# Patient Record
Sex: MALE | Race: WHITE | Employment: FULL TIME | URBAN - METROPOLITAN AREA
[De-identification: names, ages, dates, MRNs, and addresses within clinical notes are randomized per-mention and may not be internally consistent; named-entity substitution may affect disease eponyms.]

---

## 2019-01-14 ENCOUNTER — OFFICE VISIT (OUTPATIENT)
Dept: FAMILY MEDICINE CLINIC | Age: 64
End: 2019-01-14

## 2019-01-14 VITALS
OXYGEN SATURATION: 98 % | WEIGHT: 167.2 LBS | TEMPERATURE: 98.7 F | HEIGHT: 67 IN | DIASTOLIC BLOOD PRESSURE: 75 MMHG | SYSTOLIC BLOOD PRESSURE: 126 MMHG | BODY MASS INDEX: 26.24 KG/M2 | RESPIRATION RATE: 16 BRPM | HEART RATE: 92 BPM

## 2019-01-14 DIAGNOSIS — I10 ESSENTIAL HYPERTENSION: ICD-10-CM

## 2019-01-14 DIAGNOSIS — J44.9 CHRONIC OBSTRUCTIVE PULMONARY DISEASE, UNSPECIFIED COPD TYPE (HCC): Primary | ICD-10-CM

## 2019-01-14 DIAGNOSIS — E78.2 MIXED HYPERLIPIDEMIA: ICD-10-CM

## 2019-01-14 DIAGNOSIS — Z11.59 ENCOUNTER FOR HEPATITIS C SCREENING TEST FOR LOW RISK PATIENT: ICD-10-CM

## 2019-01-14 RX ORDER — FLUTICASONE FUROATE AND VILANTEROL 100; 25 UG/1; UG/1
POWDER RESPIRATORY (INHALATION)
COMMUNITY
Start: 2017-03-19 | End: 2019-01-14 | Stop reason: SDUPTHER

## 2019-01-14 RX ORDER — ATORVASTATIN CALCIUM 40 MG/1
40 TABLET, FILM COATED ORAL DAILY
COMMUNITY
Start: 2017-03-13 | End: 2019-01-14 | Stop reason: SDUPTHER

## 2019-01-14 RX ORDER — AMLODIPINE BESYLATE AND BENAZEPRIL HYDROCHLORIDE 10; 40 MG/1; MG/1
1 CAPSULE ORAL DAILY
Qty: 90 CAP | Refills: 1 | Status: SHIPPED | OUTPATIENT
Start: 2019-01-14 | End: 2019-07-16 | Stop reason: SDUPTHER

## 2019-01-14 RX ORDER — MELOXICAM 15 MG/1
15 TABLET ORAL DAILY
COMMUNITY
End: 2019-01-14 | Stop reason: SDUPTHER

## 2019-01-14 RX ORDER — METOPROLOL SUCCINATE 100 MG/1
100 TABLET, EXTENDED RELEASE ORAL DAILY
Qty: 90 TAB | Refills: 1 | Status: SHIPPED | OUTPATIENT
Start: 2019-01-14 | End: 2019-07-18 | Stop reason: SDUPTHER

## 2019-01-14 RX ORDER — MELOXICAM 15 MG/1
15 TABLET ORAL DAILY
Qty: 90 TAB | Refills: 1 | Status: SHIPPED | OUTPATIENT
Start: 2019-01-14 | End: 2019-08-09 | Stop reason: SDUPTHER

## 2019-01-14 RX ORDER — METOPROLOL SUCCINATE 100 MG/1
100 TABLET, EXTENDED RELEASE ORAL DAILY
COMMUNITY
Start: 2017-03-27 | End: 2019-01-14 | Stop reason: SDUPTHER

## 2019-01-14 RX ORDER — ALBUTEROL SULFATE 5 MG/ML
SOLUTION RESPIRATORY (INHALATION) ONCE
COMMUNITY
End: 2021-02-02 | Stop reason: SDUPTHER

## 2019-01-14 RX ORDER — ATORVASTATIN CALCIUM 40 MG/1
40 TABLET, FILM COATED ORAL DAILY
Qty: 90 TAB | Refills: 1 | Status: SHIPPED | OUTPATIENT
Start: 2019-01-14 | End: 2019-09-14 | Stop reason: SDUPTHER

## 2019-01-14 RX ORDER — ALBUTEROL SULFATE 90 UG/1
AEROSOL, METERED RESPIRATORY (INHALATION)
COMMUNITY
Start: 2017-03-27 | End: 2019-01-14 | Stop reason: ALTCHOICE

## 2019-01-14 RX ORDER — AMLODIPINE BESYLATE AND BENAZEPRIL HYDROCHLORIDE 10; 40 MG/1; MG/1
1 CAPSULE ORAL DAILY
COMMUNITY
Start: 2017-03-13 | End: 2019-01-14 | Stop reason: SDUPTHER

## 2019-01-14 RX ORDER — FLUTICASONE FUROATE AND VILANTEROL 100; 25 UG/1; UG/1
1 POWDER RESPIRATORY (INHALATION) DAILY
Qty: 1 INHALER | Refills: 5 | Status: SHIPPED | OUTPATIENT
Start: 2019-01-14 | End: 2019-05-20 | Stop reason: DRUGHIGH

## 2019-01-14 NOTE — PROGRESS NOTES
HISTORY OF PRESENT ILLNESS  Oracio Truong is a 66 y.o. male. Patient presents to establish care    HPI  Pt is frustrated with his PCP. He states he has to go every month for OV to get his meds refilled. He states he does not understand this. Review of Systems   Constitutional: Negative. Eyes: Negative. Respiratory: Negative. Cardiovascular: Negative. Gastrointestinal: Negative. Visit Vitals  /75 (BP 1 Location: Left arm, BP Patient Position: Sitting)   Pulse 92   Temp 98.7 °F (37.1 °C) (Oral)   Resp 16   Ht 5' 7\" (1.702 m)   Wt 167 lb 3.2 oz (75.8 kg)   SpO2 98%   BMI 26.19 kg/m²     Physical Exam   Constitutional: He appears well-developed. No distress. Cardiovascular: Normal rate, regular rhythm and normal heart sounds. No murmur heard. Pulmonary/Chest: Effort normal and breath sounds normal. No respiratory distress. He has no wheezes. He has no rales. Musculoskeletal: He exhibits no edema. ASSESSMENT and PLAN    ICD-10-CM ICD-9-CM    1. Chronic obstructive pulmonary disease, unspecified COPD type ([facility] J44.9 496 fluticasone-vilanterol (BREO ELLIPTA) 100-25 mcg/dose inhaler      tiotropium bromide (SPIRIVA RESPIMAT) 1.25 mcg/actuation inhaler   2. Essential hypertension H28 132.3 METABOLIC PANEL, COMPREHENSIVE      metoprolol succinate (TOPROL-XL) 100 mg tablet      amLODIPine-benazepril (LOTREL) 10-40 mg per capsule      METABOLIC PANEL, COMPREHENSIVE   3. Mixed hyperlipidemia E78.2 272.2 LIPID PANEL      atorvastatin (LIPITOR) 40 mg tablet      LIPID PANEL   4. Encounter for hepatitis C screening test for low risk patient Z11.59 V73.89 HEPATITIS BE AB      HEPATITIS BE AB     PLAN:  We discussed his past history. Together we reviewed his medication list and labs that were done in August 2018    I have discussed the diagnosis with the patient and the intended plan as seen in the above orders.   The patient has received an after-visit summary and questions were answered concerning future plans. I have discussed medication side effects and warnings with the patient as well. Patient will call for further questions. Follow-up Disposition:  Return in about 6 months (around 7/14/2019).

## 2019-01-14 NOTE — PROGRESS NOTES
Patient is in the office today to establish care. 1. Have you been to the ER, urgent care clinic since your last visit? Hospitalized since your last visit? No    2. Have you seen or consulted any other health care providers outside of the [address] [place] [address] since your last visit? Include any pap smears or colon screening.  No

## 2019-01-15 LAB
A-G RATIO,AGRAT: 2 RATIO (ref 1.1–2.6)
ALBUMIN SERPL-MCNC: 4.6 G/DL (ref 3.5–5)
ALP SERPL-CCNC: 120 U/L (ref 40–125)
ALT SERPL-CCNC: 31 U/L (ref 5–40)
ANION GAP SERPL CALC-SCNC: 15 MMOL/L
AST SERPL W P-5'-P-CCNC: 25 U/L (ref 10–37)
BILIRUB SERPL-MCNC: 0.6 MG/DL (ref 0.2–1.2)
BUN SERPL-MCNC: 10 MG/DL (ref 6–22)
CALCIUM SERPL-MCNC: 9.5 MG/DL (ref 8.4–10.4)
CHLORIDE SERPL-SCNC: 95 MMOL/L (ref 98–110)
CHOLEST SERPL-MCNC: 190 MG/DL (ref 110–200)
CO2 SERPL-SCNC: 27 MMOL/L (ref 20–32)
CREAT SERPL-MCNC: 0.7 MG/DL (ref 0.8–1.6)
GFRAA, 66117: >60
GFRNA, 66118: >60
GLOBULIN,GLOB: 2.3 G/DL (ref 2–4)
GLUCOSE SERPL-MCNC: 96 MG/DL (ref 70–99)
HDLC SERPL-MCNC: 2.1 MG/DL (ref 0–5)
HDLC SERPL-MCNC: 89 MG/DL (ref 40–59)
LDLC SERPL CALC-MCNC: 76 MG/DL (ref 50–99)
POTASSIUM SERPL-SCNC: 5.4 MMOL/L (ref 3.5–5.5)
PROT SERPL-MCNC: 6.9 G/DL (ref 6.2–8.1)
SODIUM SERPL-SCNC: 137 MMOL/L (ref 133–145)
TRIGL SERPL-MCNC: 126 MG/DL (ref 40–149)
VLDLC SERPL CALC-MCNC: 25 MG/DL (ref 8–30)

## 2019-01-17 LAB — HEPATITIS BE AB, 006635: NEGATIVE

## 2019-05-20 ENCOUNTER — OFFICE VISIT (OUTPATIENT)
Dept: FAMILY MEDICINE CLINIC | Age: 64
End: 2019-05-20

## 2019-05-20 VITALS
DIASTOLIC BLOOD PRESSURE: 78 MMHG | HEART RATE: 90 BPM | WEIGHT: 160 LBS | RESPIRATION RATE: 17 BRPM | OXYGEN SATURATION: 98 % | TEMPERATURE: 98.2 F | HEIGHT: 67 IN | SYSTOLIC BLOOD PRESSURE: 156 MMHG | BODY MASS INDEX: 25.11 KG/M2

## 2019-05-20 DIAGNOSIS — E78.2 MIXED HYPERLIPIDEMIA: ICD-10-CM

## 2019-05-20 DIAGNOSIS — I10 ESSENTIAL HYPERTENSION: ICD-10-CM

## 2019-05-20 DIAGNOSIS — J44.9 CHRONIC OBSTRUCTIVE PULMONARY DISEASE, UNSPECIFIED COPD TYPE (HCC): Primary | ICD-10-CM

## 2019-05-20 RX ORDER — FLUTICASONE FUROATE AND VILANTEROL 200; 25 UG/1; UG/1
1 POWDER RESPIRATORY (INHALATION) DAILY
Qty: 1 INHALER | Refills: 5 | Status: SHIPPED | OUTPATIENT
Start: 2019-05-20 | End: 2019-11-18 | Stop reason: SDUPTHER

## 2019-05-20 NOTE — PROGRESS NOTES
Chief Complaint   Patient presents with    Medication Evaluation     1. Have you been to the ER, urgent care clinic since your last visit? Hospitalized since your last visit? No    2. Have you seen or consulted any other health care providers outside of the [address] [place] [address] since your last visit? Include any pap smears or colon screening.  No

## 2019-05-20 NOTE — PROGRESS NOTES
HISTORY OF PRESENT ILLNESS  Melquiades Wynn is a 60 y.o. male. Patient presents for med check. HPI  Pt uses about 3 inhalers in course of 6 weeks. He is not sure that the other inhalers help him. Pt started mucinex last week because his congestion has gotten thicker but no color change. Review of Systems   Constitutional: Negative. HENT: Positive for congestion. Respiratory: Positive for cough, sputum production and shortness of breath. Cardiovascular: Negative. Visit Vitals  /78 (BP 1 Location: Left arm, BP Patient Position: Sitting)   Pulse 90   Temp 98.2 °F (36.8 °C) (Oral)   Resp 17   Ht 5' 7\" (1.702 m)   Wt 160 lb (72.6 kg)   SpO2 98%   BMI 25.06 kg/m²     Physical Exam   Constitutional: He is oriented to person, place, and time. He appears well-developed. No distress. Cardiovascular: Normal rate and regular rhythm. No murmur heard. Pulmonary/Chest: Effort normal. No respiratory distress. He has no wheezes. He has no rales. Neurological: He is alert and oriented to person, place, and time. bronchial breath sounds which clear on cough. ASSESSMENT and PLAN    ICD-10-CM ICD-9-CM    1. Chronic obstructive pulmonary disease, unspecified COPD type (HCC) J44.9 496 fluticasone furoate-vilanterol (BREO ELLIPTA) 200-25 mcg/dose inhaler      tiotropium bromide (SPIRIVA RESPIMAT) 2.5 mcg/actuation inhaler   2. Essential hypertension S07 039.4 METABOLIC PANEL, COMPREHENSIVE      METABOLIC PANEL, COMPREHENSIVE   3. Mixed hyperlipidemia E78.2 272.2 LIPID PANEL      LIPID PANEL     PLAN:  We discussed his COPD and my concerns that it is not controlled since he is going through 3 rescue inhalers every 6 weeks. Pt's Breo was increased from 100/25 to 200/25  And his   Spiriva from 1.25 was increased to 2.5    Pt is to call with any concerns. I have discussed the diagnosis with the patient and the intended plan as seen in the above orders.   The patient has received an after-visit summary and questions were answered concerning future plans. I have discussed medication side effects and warnings with the patient as well. Patient will call for further questions. Follow-up and Dispositions    · Return in about 6 months (around 11/20/2019) for cancel July appt and sched one for November.

## 2019-05-21 LAB
A-G RATIO,AGRAT: 2.4 RATIO (ref 1.1–2.6)
ALBUMIN SERPL-MCNC: 4.7 G/DL (ref 3.5–5)
ALP SERPL-CCNC: 125 U/L (ref 40–125)
ALT SERPL-CCNC: 28 U/L (ref 5–40)
ANION GAP SERPL CALC-SCNC: 14 MMOL/L
AST SERPL W P-5'-P-CCNC: 28 U/L (ref 10–37)
BILIRUB SERPL-MCNC: 0.5 MG/DL (ref 0.2–1.2)
BUN SERPL-MCNC: 10 MG/DL (ref 6–22)
CALCIUM SERPL-MCNC: 9.3 MG/DL (ref 8.4–10.4)
CHLORIDE SERPL-SCNC: 99 MMOL/L (ref 98–110)
CHOLEST SERPL-MCNC: 173 MG/DL (ref 110–200)
CO2 SERPL-SCNC: 23 MMOL/L (ref 20–32)
CREAT SERPL-MCNC: 0.6 MG/DL (ref 0.8–1.6)
GFRAA, 66117: >60
GFRNA, 66118: >60
GLOBULIN,GLOB: 2 G/DL (ref 2–4)
GLUCOSE SERPL-MCNC: 103 MG/DL (ref 70–99)
HDLC SERPL-MCNC: 2.4 MG/DL (ref 0–5)
HDLC SERPL-MCNC: 71 MG/DL (ref 40–59)
LDLC SERPL CALC-MCNC: 83 MG/DL (ref 50–99)
POTASSIUM SERPL-SCNC: 4.5 MMOL/L (ref 3.5–5.5)
PROT SERPL-MCNC: 6.7 G/DL (ref 6.2–8.1)
SODIUM SERPL-SCNC: 136 MMOL/L (ref 133–145)
TRIGL SERPL-MCNC: 92 MG/DL (ref 40–149)
VLDLC SERPL CALC-MCNC: 18 MG/DL (ref 8–30)

## 2019-06-19 RX ORDER — ALBUTEROL SULFATE 90 UG/1
AEROSOL, METERED RESPIRATORY (INHALATION)
Qty: 1 INHALER | Refills: 1 | Status: SHIPPED | OUTPATIENT
Start: 2019-06-19 | End: 2019-07-26 | Stop reason: SDUPTHER

## 2019-07-16 DIAGNOSIS — I10 ESSENTIAL HYPERTENSION: ICD-10-CM

## 2019-07-17 RX ORDER — AMLODIPINE BESYLATE AND BENAZEPRIL HYDROCHLORIDE 10; 40 MG/1; MG/1
CAPSULE ORAL
Qty: 90 CAP | Refills: 1 | Status: SHIPPED | OUTPATIENT
Start: 2019-07-17 | End: 2020-02-03

## 2019-07-18 DIAGNOSIS — I10 ESSENTIAL HYPERTENSION: ICD-10-CM

## 2019-07-18 RX ORDER — METOPROLOL SUCCINATE 100 MG/1
TABLET, EXTENDED RELEASE ORAL
Qty: 90 TAB | Refills: 1 | Status: SHIPPED | OUTPATIENT
Start: 2019-07-18 | End: 2020-02-03

## 2019-07-26 RX ORDER — ALBUTEROL SULFATE 90 UG/1
AEROSOL, METERED RESPIRATORY (INHALATION)
Qty: 18 G | Refills: 1 | Status: SHIPPED | OUTPATIENT
Start: 2019-07-26 | End: 2019-08-27 | Stop reason: SDUPTHER

## 2019-08-27 RX ORDER — ALBUTEROL SULFATE 90 UG/1
AEROSOL, METERED RESPIRATORY (INHALATION)
Qty: 18 G | Refills: 1 | Status: SHIPPED | OUTPATIENT
Start: 2019-08-27 | End: 2019-09-29 | Stop reason: SDUPTHER

## 2019-10-01 RX ORDER — ALBUTEROL SULFATE 90 UG/1
AEROSOL, METERED RESPIRATORY (INHALATION)
Qty: 18 G | Refills: 1 | Status: SHIPPED | OUTPATIENT
Start: 2019-10-01 | End: 2019-11-01 | Stop reason: SDUPTHER

## 2019-11-01 RX ORDER — ALBUTEROL SULFATE 90 UG/1
AEROSOL, METERED RESPIRATORY (INHALATION)
Qty: 18 G | Refills: 1 | Status: SHIPPED | OUTPATIENT
Start: 2019-11-01 | End: 2019-12-06 | Stop reason: SDUPTHER

## 2019-11-18 ENCOUNTER — OFFICE VISIT (OUTPATIENT)
Dept: FAMILY MEDICINE CLINIC | Age: 64
End: 2019-11-18

## 2019-11-18 VITALS
OXYGEN SATURATION: 98 % | HEART RATE: 83 BPM | BODY MASS INDEX: 24.96 KG/M2 | RESPIRATION RATE: 16 BRPM | DIASTOLIC BLOOD PRESSURE: 78 MMHG | WEIGHT: 159 LBS | TEMPERATURE: 97.8 F | HEIGHT: 67 IN | SYSTOLIC BLOOD PRESSURE: 144 MMHG

## 2019-11-18 DIAGNOSIS — Z12.11 SCREENING FOR COLON CANCER: ICD-10-CM

## 2019-11-18 DIAGNOSIS — J44.9 CHRONIC OBSTRUCTIVE PULMONARY DISEASE, UNSPECIFIED COPD TYPE (HCC): ICD-10-CM

## 2019-11-18 DIAGNOSIS — E78.2 MIXED HYPERLIPIDEMIA: Primary | ICD-10-CM

## 2019-11-18 DIAGNOSIS — Z12.5 SCREENING FOR PROSTATE CANCER: ICD-10-CM

## 2019-11-18 DIAGNOSIS — Z11.59 ENCOUNTER FOR HEPATITIS C SCREENING TEST FOR LOW RISK PATIENT: ICD-10-CM

## 2019-11-18 RX ORDER — FLUTICASONE FUROATE AND VILANTEROL TRIFENATATE 200; 25 UG/1; UG/1
POWDER RESPIRATORY (INHALATION)
Qty: 1 EACH | Refills: 3 | Status: SHIPPED | OUTPATIENT
Start: 2019-11-18 | End: 2020-03-17

## 2019-11-18 RX ORDER — ATORVASTATIN CALCIUM 40 MG/1
TABLET, FILM COATED ORAL
Qty: 90 TAB | Refills: 1 | Status: CANCELLED | OUTPATIENT
Start: 2019-11-18

## 2019-11-18 NOTE — PROGRESS NOTES
HISTORY OF PRESENT ILLNESS  Manda Turner is a 60 y.o. male. Patient presents for chronic care follow. HPI  Doing well. He just recently got refills. He is now retired. Review of Systems   Constitutional: Negative. Respiratory: Positive for cough (no change) and sputum production (stable). Negative for shortness of breath. Cardiovascular: Negative. Gastrointestinal: Negative. Musculoskeletal: Positive for back pain and joint pain. Visit Vitals  /78 (BP 1 Location: Left arm)   Pulse 83   Temp 97.8 °F (36.6 °C) (Oral)   Resp 16   Ht 5' 7\" (1.702 m)   Wt 159 lb (72.1 kg)   SpO2 98%   BMI 24.90 kg/m²     Physical Exam   Constitutional: He is oriented to person, place, and time. He appears well-developed. No distress. Cardiovascular: Normal rate, regular rhythm and normal heart sounds. No murmur heard. Pulmonary/Chest: Effort normal and breath sounds normal. No respiratory distress. He has no wheezes. He has no rales. Neurological: He is alert and oriented to person, place, and time. ASSESSMENT and PLAN    ICD-10-CM ICD-9-CM    1. Mixed hyperlipidemia E78.2 272.2 LIPID PANEL      METABOLIC PANEL, COMPREHENSIVE   2. Encounter for hepatitis C screening test for low risk patient Z11.59 V73.89 HCV RNA AMELIA QUALITATIVE   3. Screening for colon cancer Z12.11 V76.51 COLOGUARD TEST (FECAL DNA COLORECTAL CANCER SCREENING)   4. Screening for prostate cancer Z12.5 V76.44 PSA SCREENING (SCREENING)   5. Chronic obstructive pulmonary disease, unspecified COPD type ([facility] J44.9 496      PLAN:  We reviewed his history. Pt given DME order for CPAP supplies> Pt understands he will have to bring this to the [place] for approval.    I have discussed the diagnosis with the patient and the intended plan as seen in the above orders. The patient has received an after-visit summary and questions were answered concerning future plans.   I have discussed medication side effects and warnings with the patient as well. Patient will call for further questions. Follow-up and Dispositions    · Return in about 6 months (around 5/18/2020) for chronic care. Pt is to call with any concerns.

## 2019-11-18 NOTE — PROGRESS NOTES
Pt is here for 6 month follow up hypertension, cholesterol    1. Have you been to the ER, urgent care clinic since your last visit? Hospitalized since your last visit? No    2. Have you seen or consulted any other health care providers outside of the [address] [place] [address] since your last visit? Include any pap smears or colon screening.  No

## 2019-11-19 LAB
A-G RATIO,AGRAT: 1.8 RATIO (ref 1.1–2.6)
ALBUMIN SERPL-MCNC: 4.4 G/DL (ref 3.5–5)
ALP SERPL-CCNC: 120 U/L (ref 40–125)
ALT SERPL-CCNC: 26 U/L (ref 5–40)
ANION GAP SERPL CALC-SCNC: 18 MMOL/L
AST SERPL W P-5'-P-CCNC: 27 U/L (ref 10–37)
BILIRUB SERPL-MCNC: 0.4 MG/DL (ref 0.2–1.2)
BUN SERPL-MCNC: 11 MG/DL (ref 6–22)
CALCIUM SERPL-MCNC: 9.2 MG/DL (ref 8.4–10.5)
CHLORIDE SERPL-SCNC: 95 MMOL/L (ref 98–110)
CHOLEST SERPL-MCNC: 168 MG/DL (ref 110–200)
CO2 SERPL-SCNC: 23 MMOL/L (ref 20–32)
CREAT SERPL-MCNC: 0.7 MG/DL (ref 0.8–1.6)
GFRAA, 66117: >60
GFRNA, 66118: >60
GLOBULIN,GLOB: 2.5 G/DL (ref 2–4)
GLUCOSE SERPL-MCNC: 90 MG/DL (ref 70–99)
HDLC SERPL-MCNC: 2 MG/DL (ref 0–5)
HDLC SERPL-MCNC: 82 MG/DL
LDL/HDL RATIO,LDHD: 0.8
LDLC SERPL CALC-MCNC: 63 MG/DL (ref 50–99)
NON-HDL CHOLESTEROL, 011976: 86 MG/DL
POTASSIUM SERPL-SCNC: 4.7 MMOL/L (ref 3.5–5.5)
PROT SERPL-MCNC: 6.9 G/DL (ref 6.2–8.1)
PSA SERPL-MCNC: 4.01 NG/ML
SODIUM SERPL-SCNC: 136 MMOL/L (ref 133–145)
TRIGL SERPL-MCNC: 114 MG/DL (ref 40–149)
VLDLC SERPL CALC-MCNC: 23 MG/DL (ref 8–30)

## 2019-11-21 LAB — HCV RNA QUAL PCR,9951751: NEGATIVE

## 2019-12-10 RX ORDER — ALBUTEROL SULFATE 90 UG/1
AEROSOL, METERED RESPIRATORY (INHALATION)
Qty: 18 G | Refills: 0 | Status: SHIPPED | OUTPATIENT
Start: 2019-12-10 | End: 2019-12-27

## 2019-12-16 ENCOUNTER — TELEPHONE (OUTPATIENT)
Dept: FAMILY MEDICINE CLINIC | Age: 64
End: 2019-12-16

## 2019-12-19 NOTE — TELEPHONE ENCOUNTER
Maya Gilmore, NP  You 3 days ago      Please advise Pt that his Cologuard is negative. Next one is in 3 years. Routing comment      Pt aware cologuard test was negative & will need to be repeated in 3 years.

## 2019-12-27 RX ORDER — ALBUTEROL SULFATE 90 UG/1
AEROSOL, METERED RESPIRATORY (INHALATION)
Qty: 18 G | Refills: 0 | Status: SHIPPED | OUTPATIENT
Start: 2019-12-27 | End: 2020-01-14

## 2020-01-03 ENCOUNTER — OFFICE VISIT (OUTPATIENT)
Dept: SURGERY | Age: 65
End: 2020-01-03

## 2020-01-03 VITALS
BODY MASS INDEX: 25.9 KG/M2 | TEMPERATURE: 98.9 F | RESPIRATION RATE: 17 BRPM | WEIGHT: 165 LBS | HEART RATE: 83 BPM | DIASTOLIC BLOOD PRESSURE: 75 MMHG | SYSTOLIC BLOOD PRESSURE: 123 MMHG | OXYGEN SATURATION: 98 % | HEIGHT: 67 IN

## 2020-01-03 DIAGNOSIS — K40.90 RIGHT INGUINAL HERNIA: Primary | ICD-10-CM

## 2020-01-03 NOTE — PROGRESS NOTES
General Surgery Consult      Vlaidmir Lee date: (Not on file)    MRN: [id number]     : 1955     Age: 60 y.o. Attending Physician: Rosalinda Sandhu MD, [facility]      History of Present Illness:     Jacqueline Velasquez is a 60 y.o. male was referred for evaluation of a symptomatic right inguinal hernia. The patient has stated that that the weeks before  he did a lot of heavy furniture and carpet lifting . He said that on [place] day he said he felt a sudden onset of severe pain in the right groin. [facility] and he was told that he has a right inguinal hernia and to follow-up with us . He said that the pain is dull and intermittent and is localized in the right groin . He only noticed a bulge when he coughs . He denies any nausea or vomiting or any change in bowel habits. He had an umbilical hernia repair in the past with the mesh. There are no active problems to display for this patient. Past Medical History:   Diagnosis Date    Chronic obstructive pulmonary disease ([address]     Hypercholesterolemia     Hypertension       Past Surgical History:   Procedure Laterality Date    HX CHOLECYSTECTOMY      HX GI      HX HERNIA REPAIR        Social History     Tobacco Use    Smoking status: Heavy Tobacco Smoker     Packs/day: 2.00    Smokeless tobacco: Former User   Substance Use Topics    Alcohol use: Yes     Comment: daily beer      Social History     Tobacco Use   Smoking Status Heavy Tobacco Smoker    Packs/day: 2.00   Smokeless Tobacco Former User     No family history on file.    Current Outpatient Medications   Medication Sig    albuterol (VENTOLIN HFA) 90 mcg/actuation inhaler inhale 1 to 2 puffs by mouth and INTO THE LUNGS every 4 to 6 hours if needed    atorvastatin (LIPITOR) 40 mg tablet take 1 tablet by mouth once daily    BREO ELLIPTA 200-25 mcg/dose inhaler inhalation 1 puff by mouth once daily    meloxicam (MOBIC) 15 mg tablet take 1 tablet by mouth once daily    metoprolol succinate (TOPROL-XL) 100 mg tablet take 1 tablet by mouth once daily    amLODIPine-benazepril (LOTREL) 10-40 mg per capsule take 1 capsule by mouth once daily    tiotropium bromide (SPIRIVA RESPIMAT) 2.5 mcg/actuation inhaler Take 2 Puffs by inhalation daily.  albuterol (PROVENTIL) 5 mg/mL nebulizer solution by Nebulization route once. No current facility-administered medications for this visit. No Known Allergies     Review of Systems:  Constitutional: negative  Eyes: negative  Ears, Nose, Mouth, Throat, and Face: negative  Respiratory: negative  Cardiovascular: negative  Gastrointestinal: positive for abdominal pain and Right groin pain and bulge  Genitourinary:negative  Integument/Breast: negative  Hematologic/Lymphatic: negative  Musculoskeletal:negative  Neurological: negative  Behavioral/Psychiatric: negative  Endocrine: negative  Allergic/Immunologic: negative    Objective:     Visit Vitals  /75   Pulse 83   Temp 98.9 °F (37.2 °C) (Oral)   Resp 17   Ht 5' 7\" (1.702 m)   Wt 74.8 kg (165 lb)   SpO2 98%   BMI 25.84 kg/m²       Physical Exam:      General:  in no apparent distress, alert, oriented times 3 and cooperative   Eyes:  conjunctivae and sclerae normal, pupils equal, round, reactive to light   Throat & Neck: no erythema or exudates noted and neck supple and symmetrical; no palpable masses   Lungs:   clear to auscultation bilaterally   Heart:  Regular rate and rhythm   Abdomen:   rounded, soft, nontender, nondistended, no masses or organomegaly . There is a right inguinal hernia that is tender to palpation.  .    Extremities: extremities normal, atraumatic, no cyanosis or edema   Skin: Normal.       Imaging and Lab Review:     CBC: No results found for: WBC, RBC, HGB, HCT, PLT, HGBEXT, HCTEXT, PLTEXT  BMP:   Lab Results   Component Value Date/Time    Glucose 90 11/18/2019 03:33 PM    Sodium 136 11/18/2019 03:33 PM    Potassium 4.7 11/18/2019 03:33 PM Chloride 95 (L) 11/18/2019 03:33 PM    CO2 23 11/18/2019 03:33 PM    BUN 11 11/18/2019 03:33 PM    Creatinine 0.7 (L) 11/18/2019 03:33 PM    Calcium 9.2 11/18/2019 03:33 PM     CMP:  Lab Results   Component Value Date/Time    Glucose 90 11/18/2019 03:33 PM    Sodium 136 11/18/2019 03:33 PM    Potassium 4.7 11/18/2019 03:33 PM    Chloride 95 (L) 11/18/2019 03:33 PM    CO2 23 11/18/2019 03:33 PM    BUN 11 11/18/2019 03:33 PM    Creatinine 0.7 (L) 11/18/2019 03:33 PM    Calcium 9.2 11/18/2019 03:33 PM    Anion gap 18.0 11/18/2019 03:33 PM    Alk. phosphatase 120 11/18/2019 03:33 PM    Protein, total 6.9 11/18/2019 03:33 PM    Albumin 4.4 11/18/2019 03:33 PM    Globulin 2.5 11/18/2019 03:33 PM    A-G Ratio 1.8 11/18/2019 03:33 PM       No results found for this or any previous visit (from the past 24 hour(s)). images and reports reviewed    Assessment:   Radha Nowak is a 60 y.o. male is presenting with a picture of symptomatic right inguinal hernia. I Discussed the possibility of incarceration, strangulation, enlargement in size over time, and the risk of emergency surgery in the face of strangulation. I also discussed the use of prosthetic materials (mesh), including the risk of infection. Also discussed the risk of surgery including recurrence and the possible need for reoperation and removal of mesh if used, possibility of postoperative small bowel injury, obstruction or ileus, and the risks of general anesthetic. I explained to the the patient about the robotic hernia repair procedure. Plan:     1. Schedule for robotic right inguinal hernia repair with placement of mesh. 2. No heavy lifting for 2 weeks after the surgery (More than 15 pounds)  3. Avoid constipation by taking stool softener.     Please call me if you have any questions (cell phone: [phone])     Signed By: Raul Vaughan MD     January 3, 2020

## 2020-01-06 NOTE — PERIOP NOTES
PAT - SURGICAL PRE-ADMISSION INSTRUCTIONS    NAME:  Harriett Doe                                                          TODAY'S DATE:  1/6/2020    SURGERY DATE:  1/9/2020                                  SURGERY ARRIVAL TIME:   0930 TBV    1. Do NOT eat or drink anything, including candy or gum, after MIDNIGHT on 1/8/20 , unless you have specific instructions from your Surgeon or Anesthesia Provider to do so. 2. No smoking on the day of surgery. 3. No alcohol 24 hours prior to the day of surgery. 4. No recreational drugs for one week prior to the day of surgery. 5. Leave all valuables, including money/purse, at home. 6. Remove all jewelry, nail polish, makeup (including mascara); no lotions, powders, deodorant, or perfume/cologne/after shave. 7. Glasses/Contact lenses and Dentures may be worn to the hospital.  They will be removed prior to surgery. 8. Call your doctor if symptoms of a cold or illness develop within 24 ours prior to surgery. 9. AN ADULT MUST DRIVE YOU HOME AFTER OUTPATIENT SURGERY. 10. If you are having an OUTPATIENT procedure, please make arrangements for a responsible adult to be with you for 24 hours after your surgery. 11. If you are admitted to the hospital, you will be assigned to a bed after surgery is complete. Normally a family member will not be able to see you until you are in your assigned bed. 15. Family is encouraged to accompany you to the hospital.  We ask visitors in the treatment area to be limited to ONE person at a time to ensure patient privacy. EXCEPTIONS WILL BE MADE AS NEEDED. 15. Children under 12 are discouraged from entering the treatment area and need to be supervised by an adult when in the waiting room. Special Instructions:    Bring inhaler. , Take these medications the morning of surgery with a sip of water:  MORNING MEDS    Patient Prep:    shower with anti-bacterial soap    These surgical instructions were reviewed with PATIENT during the PAT PHONE CALL. Directions: On the morning of surgery, please go to the [facility]. Enter the building from the [facility] entrance, 1st floor (next to the Emergency Room entrance). Take the elevator to the 2nd floor. Sign in at the Registration Desk.     If you have any questions and/or concerns, please do not hesitate to call:  (Prior to the day of surgery)  [facility] unit:  [phone]  (Day of surgery)  [facility] unit:  [phone]

## 2020-01-08 ENCOUNTER — ANESTHESIA EVENT (OUTPATIENT)
Dept: SURGERY | Age: 65
End: 2020-01-08
Payer: COMMERCIAL

## 2020-01-09 ENCOUNTER — ANESTHESIA (OUTPATIENT)
Dept: SURGERY | Age: 65
End: 2020-01-09
Payer: COMMERCIAL

## 2020-01-09 ENCOUNTER — HOSPITAL ENCOUNTER (OUTPATIENT)
Age: 65
Setting detail: OUTPATIENT SURGERY
Discharge: HOME | End: 2020-01-09
Attending: SURGERY | Admitting: SURGERY
Payer: COMMERCIAL

## 2020-01-09 VITALS
HEART RATE: 51 BPM | BODY MASS INDEX: 25.74 KG/M2 | DIASTOLIC BLOOD PRESSURE: 69 MMHG | HEIGHT: 67 IN | OXYGEN SATURATION: 98 % | TEMPERATURE: 97.4 F | WEIGHT: 164 LBS | SYSTOLIC BLOOD PRESSURE: 121 MMHG | RESPIRATION RATE: 16 BRPM

## 2020-01-09 LAB
ATRIAL RATE: 73 BPM
CALCULATED P AXIS, ECG09: 79 DEGREES
CALCULATED R AXIS, ECG10: -84 DEGREES
CALCULATED T AXIS, ECG11: 82 DEGREES
DIAGNOSIS, 93000: NORMAL
P-R INTERVAL, ECG05: 150 MS
Q-T INTERVAL, ECG07: 370 MS
QRS DURATION, ECG06: 106 MS
QTC CALCULATION (BEZET), ECG08: 407 MS
VENTRICULAR RATE, ECG03: 73 BPM

## 2020-01-09 PROCEDURE — 77030031139 HC SUT VCRL2 J&J -A: Performed by: SURGERY

## 2020-01-09 PROCEDURE — 74011250636 HC RX REV CODE- 250/636: Performed by: NURSE ANESTHETIST, CERTIFIED REGISTERED

## 2020-01-09 PROCEDURE — 77030018842 HC SOL IRR SOD CL 9% BAXT -A: Performed by: SURGERY

## 2020-01-09 PROCEDURE — 74011000258 HC RX REV CODE- 258: Performed by: NURSE ANESTHETIST, CERTIFIED REGISTERED

## 2020-01-09 PROCEDURE — 74011000250 HC RX REV CODE- 250: Performed by: SURGERY

## 2020-01-09 PROCEDURE — 74011250636 HC RX REV CODE- 250/636: Performed by: SURGERY

## 2020-01-09 PROCEDURE — 77030008477 HC STYL SATN SLP COVD -A: Performed by: NURSE ANESTHETIST, CERTIFIED REGISTERED

## 2020-01-09 PROCEDURE — C1781 MESH (IMPLANTABLE): HCPCS | Performed by: SURGERY

## 2020-01-09 PROCEDURE — 77030002933 HC SUT MCRYL J&J -A: Performed by: SURGERY

## 2020-01-09 PROCEDURE — 77030020703 HC SEAL CANN DISP INTU -B: Performed by: SURGERY

## 2020-01-09 PROCEDURE — 77030008683 HC TU ET CUF COVD -A: Performed by: NURSE ANESTHETIST, CERTIFIED REGISTERED

## 2020-01-09 PROCEDURE — 77030022704 HC SUT VLOC COVD -B: Performed by: SURGERY

## 2020-01-09 PROCEDURE — 74011250637 HC RX REV CODE- 250/637: Performed by: NURSE ANESTHETIST, CERTIFIED REGISTERED

## 2020-01-09 PROCEDURE — 74011000250 HC RX REV CODE- 250: Performed by: NURSE ANESTHETIST, CERTIFIED REGISTERED

## 2020-01-09 PROCEDURE — 93005 ELECTROCARDIOGRAM TRACING: CPT

## 2020-01-09 PROCEDURE — 76060000032 HC ANESTHESIA 0.5 TO 1 HR: Performed by: SURGERY

## 2020-01-09 PROCEDURE — 77030010507 HC ADH SKN DERMBND J&J -B: Performed by: SURGERY

## 2020-01-09 PROCEDURE — 76210000006 HC OR PH I REC 0.5 TO 1 HR: Performed by: SURGERY

## 2020-01-09 PROCEDURE — 74011000272 HC RX REV CODE- 272: Performed by: SURGERY

## 2020-01-09 PROCEDURE — 76210000021 HC REC RM PH II 0.5 TO 1 HR: Performed by: SURGERY

## 2020-01-09 PROCEDURE — 76010000933 HC OR TIME 0.5 TO 1HR INTENSV - TIER 2: Performed by: SURGERY

## 2020-01-09 PROCEDURE — 77030035277 HC OBTRTR BLDELSS DISP INTU -B: Performed by: SURGERY

## 2020-01-09 DEVICE — LAPAROSCOPIC SELF-FIXATING MESH POLYESTER WITH POLYLACTIC ACID GRIPS AND COLLAGEN FILM
Type: IMPLANTABLE DEVICE | Site: INGUINAL | Status: FUNCTIONAL
Brand: PROGRIP

## 2020-01-09 RX ORDER — SODIUM CHLORIDE, SODIUM LACTATE, POTASSIUM CHLORIDE, CALCIUM CHLORIDE 600; 310; 30; 20 MG/100ML; MG/100ML; MG/100ML; MG/100ML
150 INJECTION, SOLUTION INTRAVENOUS CONTINUOUS
Status: DISCONTINUED | OUTPATIENT
Start: 2020-01-09 | End: 2020-01-09 | Stop reason: HOSPADM

## 2020-01-09 RX ORDER — KETOROLAC TROMETHAMINE 30 MG/ML
INJECTION, SOLUTION INTRAMUSCULAR; INTRAVENOUS AS NEEDED
Status: DISCONTINUED | OUTPATIENT
Start: 2020-01-09 | End: 2020-01-09 | Stop reason: HOSPADM

## 2020-01-09 RX ORDER — KETOROLAC TROMETHAMINE 10 MG/1
10 TABLET, FILM COATED ORAL
Qty: 24 TAB | Refills: 0 | Status: SHIPPED | OUTPATIENT
Start: 2020-01-09 | End: 2021-02-02 | Stop reason: ALTCHOICE

## 2020-01-09 RX ORDER — VECURONIUM BROMIDE FOR INJECTION 1 MG/ML
INJECTION, POWDER, LYOPHILIZED, FOR SOLUTION INTRAVENOUS AS NEEDED
Status: DISCONTINUED | OUTPATIENT
Start: 2020-01-09 | End: 2020-01-09 | Stop reason: HOSPADM

## 2020-01-09 RX ORDER — FENTANYL CITRATE 50 UG/ML
INJECTION, SOLUTION INTRAMUSCULAR; INTRAVENOUS AS NEEDED
Status: DISCONTINUED | OUTPATIENT
Start: 2020-01-09 | End: 2020-01-09 | Stop reason: HOSPADM

## 2020-01-09 RX ORDER — FENTANYL CITRATE 50 UG/ML
25 INJECTION, SOLUTION INTRAMUSCULAR; INTRAVENOUS AS NEEDED
Status: DISCONTINUED | OUTPATIENT
Start: 2020-01-09 | End: 2020-01-09 | Stop reason: HOSPADM

## 2020-01-09 RX ORDER — MIDAZOLAM HYDROCHLORIDE 1 MG/ML
INJECTION, SOLUTION INTRAMUSCULAR; INTRAVENOUS AS NEEDED
Status: DISCONTINUED | OUTPATIENT
Start: 2020-01-09 | End: 2020-01-09 | Stop reason: HOSPADM

## 2020-01-09 RX ORDER — MAGNESIUM SULFATE 100 %
4 CRYSTALS MISCELLANEOUS AS NEEDED
Status: DISCONTINUED | OUTPATIENT
Start: 2020-01-09 | End: 2020-01-09 | Stop reason: HOSPADM

## 2020-01-09 RX ORDER — SUCCINYLCHOLINE CHLORIDE 100 MG/5ML
SYRINGE (ML) INTRAVENOUS AS NEEDED
Status: DISCONTINUED | OUTPATIENT
Start: 2020-01-09 | End: 2020-01-09 | Stop reason: HOSPADM

## 2020-01-09 RX ORDER — SODIUM CHLORIDE, SODIUM LACTATE, POTASSIUM CHLORIDE, CALCIUM CHLORIDE 600; 310; 30; 20 MG/100ML; MG/100ML; MG/100ML; MG/100ML
25 INJECTION, SOLUTION INTRAVENOUS CONTINUOUS
Status: DISCONTINUED | OUTPATIENT
Start: 2020-01-09 | End: 2020-01-09 | Stop reason: HOSPADM

## 2020-01-09 RX ORDER — ACETAMINOPHEN 500 MG
500 TABLET ORAL
Qty: 24 TAB | Refills: 0 | Status: SHIPPED | OUTPATIENT
Start: 2020-01-09 | End: 2021-03-05 | Stop reason: ALTCHOICE

## 2020-01-09 RX ORDER — LIDOCAINE HYDROCHLORIDE 20 MG/ML
INJECTION, SOLUTION EPIDURAL; INFILTRATION; INTRACAUDAL; PERINEURAL AS NEEDED
Status: DISCONTINUED | OUTPATIENT
Start: 2020-01-09 | End: 2020-01-09 | Stop reason: HOSPADM

## 2020-01-09 RX ORDER — GLYCOPYRROLATE 0.2 MG/ML
INJECTION INTRAMUSCULAR; INTRAVENOUS AS NEEDED
Status: DISCONTINUED | OUTPATIENT
Start: 2020-01-09 | End: 2020-01-09 | Stop reason: HOSPADM

## 2020-01-09 RX ORDER — CEFAZOLIN SODIUM 2 G/50ML
2 SOLUTION INTRAVENOUS
Status: COMPLETED | OUTPATIENT
Start: 2020-01-09 | End: 2020-01-09

## 2020-01-09 RX ORDER — SODIUM CHLORIDE 0.9 % (FLUSH) 0.9 %
5-40 SYRINGE (ML) INJECTION EVERY 8 HOURS
Status: DISCONTINUED | OUTPATIENT
Start: 2020-01-09 | End: 2020-01-09 | Stop reason: HOSPADM

## 2020-01-09 RX ORDER — ONDANSETRON 2 MG/ML
INJECTION INTRAMUSCULAR; INTRAVENOUS AS NEEDED
Status: DISCONTINUED | OUTPATIENT
Start: 2020-01-09 | End: 2020-01-09 | Stop reason: HOSPADM

## 2020-01-09 RX ORDER — SODIUM CHLORIDE 0.9 % (FLUSH) 0.9 %
5-40 SYRINGE (ML) INJECTION AS NEEDED
Status: DISCONTINUED | OUTPATIENT
Start: 2020-01-09 | End: 2020-01-09 | Stop reason: HOSPADM

## 2020-01-09 RX ORDER — ONDANSETRON 2 MG/ML
4 INJECTION INTRAMUSCULAR; INTRAVENOUS ONCE
Status: DISCONTINUED | OUTPATIENT
Start: 2020-01-09 | End: 2020-01-09 | Stop reason: HOSPADM

## 2020-01-09 RX ORDER — HYDROMORPHONE HYDROCHLORIDE 1 MG/ML
0.5 INJECTION, SOLUTION INTRAMUSCULAR; INTRAVENOUS; SUBCUTANEOUS
Status: DISCONTINUED | OUTPATIENT
Start: 2020-01-09 | End: 2020-01-09 | Stop reason: HOSPADM

## 2020-01-09 RX ORDER — FAMOTIDINE 20 MG/1
20 TABLET, FILM COATED ORAL ONCE
Status: COMPLETED | OUTPATIENT
Start: 2020-01-09 | End: 2020-01-09

## 2020-01-09 RX ORDER — HYDROCODONE BITARTRATE AND ACETAMINOPHEN 5; 325 MG/1; MG/1
1 TABLET ORAL AS NEEDED
Status: DISCONTINUED | OUTPATIENT
Start: 2020-01-09 | End: 2020-01-09 | Stop reason: HOSPADM

## 2020-01-09 RX ORDER — EPHEDRINE SULFATE/0.9% NACL/PF 50 MG/5 ML
SYRINGE (ML) INTRAVENOUS AS NEEDED
Status: DISCONTINUED | OUTPATIENT
Start: 2020-01-09 | End: 2020-01-09 | Stop reason: HOSPADM

## 2020-01-09 RX ORDER — NEOSTIGMINE METHYLSULFATE 1 MG/ML
INJECTION, SOLUTION INTRAVENOUS AS NEEDED
Status: DISCONTINUED | OUTPATIENT
Start: 2020-01-09 | End: 2020-01-09 | Stop reason: HOSPADM

## 2020-01-09 RX ORDER — PROPOFOL 10 MG/ML
INJECTION, EMULSION INTRAVENOUS AS NEEDED
Status: DISCONTINUED | OUTPATIENT
Start: 2020-01-09 | End: 2020-01-09 | Stop reason: HOSPADM

## 2020-01-09 RX ADMIN — CEFAZOLIN SODIUM 2 G: 2 SOLUTION INTRAVENOUS at 11:11

## 2020-01-09 RX ADMIN — LIDOCAINE HYDROCHLORIDE 60 MG: 20 INJECTION, SOLUTION INTRAVENOUS at 11:12

## 2020-01-09 RX ADMIN — Medication 10 MG: at 11:23

## 2020-01-09 RX ADMIN — Medication 3 MG: at 11:44

## 2020-01-09 RX ADMIN — SODIUM CHLORIDE, SODIUM LACTATE, POTASSIUM CHLORIDE, AND CALCIUM CHLORIDE: 600; 310; 30; 20 INJECTION, SOLUTION INTRAVENOUS at 10:59

## 2020-01-09 RX ADMIN — DEXMEDETOMIDINE: 100 INJECTION, SOLUTION, CONCENTRATE INTRAVENOUS at 11:20

## 2020-01-09 RX ADMIN — GLYCOPYRROLATE 0.4 MG: 0.2 INJECTION INTRAMUSCULAR; INTRAVENOUS at 11:44

## 2020-01-09 RX ADMIN — GLYCOPYRROLATE 0.2 MG: 0.2 INJECTION INTRAMUSCULAR; INTRAVENOUS at 11:22

## 2020-01-09 RX ADMIN — VECURONIUM BROMIDE FOR INJECTION 2 MG: 1 INJECTION, POWDER, LYOPHILIZED, FOR SOLUTION INTRAVENOUS at 11:16

## 2020-01-09 RX ADMIN — PROPOFOL 160 MG: 10 INJECTION, EMULSION INTRAVENOUS at 11:12

## 2020-01-09 RX ADMIN — VECURONIUM BROMIDE FOR INJECTION 1 MG: 1 INJECTION, POWDER, LYOPHILIZED, FOR SOLUTION INTRAVENOUS at 11:12

## 2020-01-09 RX ADMIN — DEXMEDETOMIDINE: 100 INJECTION, SOLUTION, CONCENTRATE INTRAVENOUS at 11:21

## 2020-01-09 RX ADMIN — KETOROLAC TROMETHAMINE 30 MG: 30 INJECTION, SOLUTION INTRAMUSCULAR; INTRAVENOUS at 11:43

## 2020-01-09 RX ADMIN — PHENYLEPHRINE HYDROCHLORIDE 100 MCG: 10 INJECTION INTRAVENOUS at 11:18

## 2020-01-09 RX ADMIN — ONDANSETRON 4 MG: 2 SOLUTION INTRAMUSCULAR; INTRAVENOUS at 11:43

## 2020-01-09 RX ADMIN — PHENYLEPHRINE HYDROCHLORIDE 100 MCG: 10 INJECTION INTRAVENOUS at 11:17

## 2020-01-09 RX ADMIN — FENTANYL CITRATE 100 MCG: 50 INJECTION, SOLUTION INTRAMUSCULAR; INTRAVENOUS at 11:12

## 2020-01-09 RX ADMIN — Medication 100 MG: at 11:12

## 2020-01-09 RX ADMIN — SODIUM CHLORIDE, SODIUM LACTATE, POTASSIUM CHLORIDE, AND CALCIUM CHLORIDE 25 ML/HR: 600; 310; 30; 20 INJECTION, SOLUTION INTRAVENOUS at 10:27

## 2020-01-09 RX ADMIN — MIDAZOLAM 2 MG: 1 INJECTION INTRAMUSCULAR; INTRAVENOUS at 11:00

## 2020-01-09 RX ADMIN — FAMOTIDINE 20 MG: 20 TABLET ORAL at 10:27

## 2020-01-09 RX ADMIN — Medication 10 MG: at 11:25

## 2020-01-09 NOTE — PERIOP NOTES
Pre-Op Summary    Pt arrived via car with family/friend and is oriented to time, place, person and situation. Patient with steady gait with none assistive devices. Visit Vitals  /82 (BP 1 Location: Right arm, BP Patient Position: At rest)   Pulse 71   Temp 97.2 °F (36.2 °C)   Resp 16   Ht 5' 7\" (1.702 m)   Wt 74.4 kg (164 lb)   SpO2 100%   BMI 25.69 kg/m²       Peripheral IV located on Right hand . Patients belongings are located with wife. Patient's point of contact is Denver Bae  and their contact number is: [phone]. They will be in the waiting room. They are able to receive medication information.  They will be going home

## 2020-01-09 NOTE — ANESTHESIA POSTPROCEDURE EVALUATION
Procedure(s):  Davinci right inguinal hernia repair with mesh  XI ROBOTIC ASSISTED. general    Anesthesia Post Evaluation      Multimodal analgesia: multimodal analgesia not used between 6 hours prior to anesthesia start to PACU discharge  Patient location during evaluation: bedside  Patient participation: complete - patient participated  Level of consciousness: awake  Pain score: 3  Pain management: adequate  Airway patency: patent  Anesthetic complications: no  Cardiovascular status: acceptable  Respiratory status: acceptable  Hydration status: acceptable  Post anesthesia nausea and vomiting:  none      Vitals Value Taken Time   /64 1/9/2020 12:07 PM   Temp 36.4 °C (97.5 °F) 1/9/2020 11:57 AM   Pulse 64 1/9/2020 12:10 PM   Resp 18 1/9/2020 12:10 PM   SpO2 99 % 1/9/2020 12:10 PM   Vitals shown include unvalidated device data.

## 2020-01-09 NOTE — BRIEF OP NOTE
BRIEF OPERATIVE NOTE    Date of Procedure: 1/9/2020   Preoperative Diagnosis: right inguinal hernia  k40.90  Postoperative Diagnosis: right inguinal hernia  k40.90    Procedure(s):  Jayden Ramirez right inguinal hernia repair with mesh  XI ROBOTIC ASSISTED  Surgeon(s) and Role:     * Polo Gao MD - Primary  Surgical Staff:  Circ-1: Gabriela Trivedi RN  Scrub Tech-1: Foster Yanez  Surg Asst-1: Indira Kilgore  Event Time In Time Out   Incision Start 1116    Incision Close 1146      Anesthesia: General   Estimated Blood Loss: Minimal.  Specimens: * No specimens in log *   Findings: Right direct inguinal hernia   Complications: None. Implants:   Implant Name Type Inv.  Item Serial No.  Lot No. LRB No. Used Action   MESH ABSORB SURG PROGRIP 10X15 -- PROGRIP - [id number]  MESH ABSORB SURG PROGRIP 10X15 -- PROGRIP  [employer]OVID[employer]EN  SURGICAL [id number] 1 Implanted

## 2020-01-09 NOTE — PROGRESS NOTES
Date of Surgery Update:  Melquiades Wynn was seen and examined. History and physical has been reviewed. The patient has been examined. There have been no significant clinical changes since the completion of the originally dated History and Physical. Will proceed with robotic right inguinal hernia repair with mesh.      Signed By: Mohamud Mendoza MD     January 9, 2020 10:35 AM

## 2020-01-09 NOTE — ANESTHESIA PREPROCEDURE EVALUATION
Patient with moderate COPD (no recent exacerbations, bronchitis, or pneumonia), Tobacco abuse (2 ppd), HTN, hyperlipidemia, DJD, and moderate ETOH consumption (6 pack beer daily; denies prior withdrawal sxs or DTs). Denies other cardiac conditions, angina, SSCP, or prior MI.     Anesthetic History   No history of anesthetic complications            Review of Systems / Medical History  Patient summary reviewed, nursing notes reviewed and pertinent labs reviewed    Pulmonary    COPD: moderate      Smoker         Neuro/Psych   Within defined limits           Cardiovascular    Hypertension: well controlled          Hyperlipidemia         GI/Hepatic/Renal  Within defined limits              Endo/Other        Arthritis     Other Findings              Physical Exam    Airway  Mallampati: II  TM Distance: 4 - 6 cm  Neck ROM: normal range of motion        Cardiovascular    Rhythm: regular  Rate: normal         Dental    Dentition: Full upper dentures and Lower partial plate     Pulmonary  Breath sounds clear to auscultation               Abdominal         Other Findings            Anesthetic Plan    ASA: 3  Anesthesia type: general            Anesthetic plan and risks discussed with: Patient

## 2020-01-09 NOTE — DISCHARGE INSTRUCTIONS
Discharge Instructions Following Surgery    Patient: Rito Negrete MRN: [id number]  SSN: [id number]    YOB: 1955  Age: 60 y.o. Sex: male      Activity  · As tolerated, walking encourage, stairs are okay. · Avoid strenuous activities - no lifting anything heavier than 15 pounds till seen in the clinic. · You may shower at home after 48 hours. Diet  · Regular diet after nausea from the anesthetic has passed. Pain  · Take pain medication as directed by your doctor. ·  Call your doctor if pain is NOT relieved by medication. Wound and Dressing Care  · There is glue on the wounds. No need for any dressing care. · Apply ice packs to the area of the surgery (like in inguinal hernia apply to the groin not the incisions) for the first 1 to 2 days  · Apply warm compresses after 2 days for pain relieve if needed    After Anesthesia  · For the first 24 hours: DO NOT Drive, Drink alcoholic beverages, or Make important decisions. · Be aware of dizziness following anesthesia and while taking pain medication. Call your doctor if  · Excessive bleeding that does not stop after holding mild pressure over the area. · Temperature of 101 degrees F or above. · Redness,excessive swelling or bruising, and/or green or yellow, smelly discharge from incision. · If nausea and vomiting continues. Appointment date/time Follow-Up Phone Calls    · Call the office at [phone] to make your follow-up appointment in 2 weeks after the surgery (if not already set up) . Dr. Leeroy Wynn cell phone number is [phone]. Please call me if you have any concerns or questions.          DISCHARGE SUMMARY from Nurse    PATIENT INSTRUCTIONS:    After general anesthesia or intravenous sedation, for 24 hours or while taking prescription Narcotics:  · Limit your activities  · Do not drive and operate hazardous machinery  · Do not make important personal or business decisions  · Do  not drink alcoholic beverages  · If you have not urinated within 8 hours after discharge, please contact your surgeon on call. Report the following to your surgeon:  · Excessive pain, swelling, redness or odor of or around the surgical area  · Temperature over 100.5  · Nausea and vomiting lasting longer than 4 hours or if unable to take medications  · Any signs of decreased circulation or nerve impairment to extremity: change in color, persistent  numbness, tingling, coldness or increase pain  · Any questions    What to do at Home:    These are general instructions for a healthy lifestyle:    No smoking/ No tobacco products/ Avoid exposure to second hand smoke  Surgeon General's Warning:  Quitting smoking now greatly reduces serious risk to your health. Obesity, smoking, and sedentary lifestyle greatly increases your risk for illness    A healthy diet, regular physical exercise & weight monitoring are important for maintaining a healthy lifestyle    You may be retaining fluid if you have a history of heart failure or if you experience any of the following symptoms:  Weight gain of 3 pounds or more overnight or 5 pounds in a week, increased swelling in our hands or feet or shortness of breath while lying flat in bed. Please call your doctor as soon as you notice any of these symptoms; do not wait until your next office visit. The discharge information has been reviewed with the patient. The patient verbalized understanding. Discharge medications reviewed with the patient and appropriate educational materials and side effects teaching were provided. ___________________________________________________________________________________________________________________________________  Patient armband removed and given to patient to take home.   Patient was informed of the privacy risks if armband lost or stolen

## 2020-01-10 NOTE — OP NOTES
[facility]  OPERATIVE REPORT    Name:  Aldo Boyce  MR#:   [id number]  :  1955  ACCOUNT #:  [de-identified]  DATE OF SERVICE:  2020    PREOPERATIVE DIAGNOSIS:  Right inguinal hernia. POSTOPERATIVE DIAGNOSIS:  Right direct inguinal hernia. PROCEDURE PERFORMED:  Robotic repair of right inguinal hernia with placement of mesh. SURGEON:  Julieta Wing MD    ASSISTANT:  Shawnee Galvan. ANESTHESIA:  General.    COMPLICATIONS:  None. SPECIMENS REMOVED:  None. IMPLANTS:  ProGrip Covidien mesh 4 x 6 inches. ESTIMATED BLOOD LOSS:  Minimal.    PROCEDURE:  The patient was brought to operating room. Anesthesia was induced. Scrubbing and draping of the abdomen was done in usual manner. A time-out was performed. A skin incision in the supraumbilical area was performed. Veress needle was inserted. Abdomen was insufflated. At this point, I decided to place the first port on the left side of the abdominal wall because the patient had a previous umbilical hernia repair. So, a skin incision was performed on the left side of the abdominal wall and a port was inserted. Abdomen was explored. There were no adhesions, so TAP block was performed on both sides of the abdominal wall and an 8-mm port was placed on the right side of the abdominal wall and another 8 mm port in the supraumbilical incision. The patient was placed in Trendelenburg position and the robot was docked and the peritoneum was opened in the right groin. The preperitoneal space was dissected. The inferior epigastric vessels were identified and protected. There was a direct inguinal hernia which was easily reduced. The cord structures were identified and dissected from the peritoneum and the Jonnie's ligament was identified and visualized. A space was created and at this point, a 4 x 6 ProGrip Covidien mesh was placed in the preperitoneal space.   It was fixed with interrupted 2-0 Vicryl suture at three places, two on the upper part and one at the Jonnie's ligament and then the peritoneum was closed on top of the mesh with a running 2-0 V-Loc suture, 6 inches. Hemostasis was secured. Instruments were removed. The robot was undocked and the skin incisions were closed with 4-0 Monocryl and glue.         Catracho Shirley MD      [id number]  D:  01/09/2020 11:46  T:  01/09/2020 22:50  JOB #:  [id number]

## 2020-01-14 RX ORDER — ALBUTEROL SULFATE 90 UG/1
AEROSOL, METERED RESPIRATORY (INHALATION)
Qty: 18 G | Refills: 0 | Status: SHIPPED | OUTPATIENT
Start: 2020-01-14 | End: 2020-02-03

## 2020-01-31 ENCOUNTER — OFFICE VISIT (OUTPATIENT)
Dept: SURGERY | Age: 65
End: 2020-01-31

## 2020-01-31 VITALS
SYSTOLIC BLOOD PRESSURE: 140 MMHG | RESPIRATION RATE: 16 BRPM | BODY MASS INDEX: 26.21 KG/M2 | HEART RATE: 89 BPM | WEIGHT: 167 LBS | DIASTOLIC BLOOD PRESSURE: 70 MMHG | OXYGEN SATURATION: 98 % | HEIGHT: 67 IN | TEMPERATURE: 98.4 F

## 2020-01-31 DIAGNOSIS — Z09 POSTOPERATIVE EXAMINATION: Primary | ICD-10-CM

## 2020-01-31 NOTE — PROGRESS NOTES
Patient seen and examined. He is doing well.   His abdomen is soft and nontender and his right groin exam is normal.  Follow-up as needed

## 2020-02-03 DIAGNOSIS — I10 ESSENTIAL HYPERTENSION: ICD-10-CM

## 2020-02-03 RX ORDER — ALBUTEROL SULFATE 90 UG/1
AEROSOL, METERED RESPIRATORY (INHALATION)
Qty: 18 G | Refills: 0 | Status: SHIPPED | OUTPATIENT
Start: 2020-02-03 | End: 2020-02-18

## 2020-02-03 RX ORDER — AMLODIPINE BESYLATE AND BENAZEPRIL HYDROCHLORIDE 10; 40 MG/1; MG/1
CAPSULE ORAL
Qty: 90 CAP | Refills: 1 | Status: SHIPPED | OUTPATIENT
Start: 2020-02-03 | End: 2020-02-16

## 2020-02-03 RX ORDER — METOPROLOL SUCCINATE 100 MG/1
TABLET, EXTENDED RELEASE ORAL
Qty: 90 TAB | Refills: 1 | Status: SHIPPED | OUTPATIENT
Start: 2020-02-03 | End: 2020-05-20

## 2020-02-18 RX ORDER — ALBUTEROL SULFATE 90 UG/1
AEROSOL, METERED RESPIRATORY (INHALATION)
Qty: 18 G | Refills: 0 | Status: SHIPPED | OUTPATIENT
Start: 2020-02-18 | End: 2020-03-03

## 2020-03-03 RX ORDER — ALBUTEROL SULFATE 90 UG/1
AEROSOL, METERED RESPIRATORY (INHALATION)
Qty: 18 G | Refills: 0 | Status: SHIPPED | OUTPATIENT
Start: 2020-03-03 | End: 2020-03-19

## 2020-03-17 DIAGNOSIS — J44.9 CHRONIC OBSTRUCTIVE PULMONARY DISEASE, UNSPECIFIED COPD TYPE (HCC): ICD-10-CM

## 2020-03-17 RX ORDER — FLUTICASONE FUROATE AND VILANTEROL TRIFENATATE 200; 25 UG/1; UG/1
POWDER RESPIRATORY (INHALATION)
Qty: 1 INHALER | Refills: 5 | Status: SHIPPED | OUTPATIENT
Start: 2020-03-17 | End: 2020-09-19

## 2020-03-19 RX ORDER — ALBUTEROL SULFATE 90 UG/1
AEROSOL, METERED RESPIRATORY (INHALATION)
Qty: 18 G | Refills: 0 | Status: SHIPPED | OUTPATIENT
Start: 2020-03-19 | End: 2020-04-02

## 2020-04-02 RX ORDER — ALBUTEROL SULFATE 90 UG/1
AEROSOL, METERED RESPIRATORY (INHALATION)
Qty: 18 G | Refills: 0 | Status: SHIPPED | OUTPATIENT
Start: 2020-04-02 | End: 2020-05-04 | Stop reason: SDUPTHER

## 2020-04-27 DIAGNOSIS — J44.9 CHRONIC OBSTRUCTIVE PULMONARY DISEASE, UNSPECIFIED COPD TYPE (HCC): ICD-10-CM

## 2020-04-27 RX ORDER — TIOTROPIUM BROMIDE INHALATION SPRAY 3.12 UG/1
SPRAY, METERED RESPIRATORY (INHALATION)
Qty: 1 INHALER | Refills: 5 | Status: SHIPPED | OUTPATIENT
Start: 2020-04-27 | End: 2020-07-22 | Stop reason: SDUPTHER

## 2020-05-04 RX ORDER — ALBUTEROL SULFATE 90 UG/1
AEROSOL, METERED RESPIRATORY (INHALATION)
Qty: 18 G | Refills: 0 | Status: SHIPPED | OUTPATIENT
Start: 2020-05-04 | End: 2020-06-02

## 2020-06-02 RX ORDER — ALBUTEROL SULFATE 90 UG/1
AEROSOL, METERED RESPIRATORY (INHALATION)
Qty: 18 G | Refills: 0 | Status: SHIPPED | OUTPATIENT
Start: 2020-06-02 | End: 2020-07-02

## 2020-06-26 RX ORDER — MELOXICAM 15 MG/1
TABLET ORAL
Qty: 90 TAB | Refills: 1 | Status: SHIPPED | OUTPATIENT
Start: 2020-06-26 | End: 2020-12-21

## 2020-07-01 NOTE — TELEPHONE ENCOUNTER
Pt called to check on refill request for the albuterol inhaler, adv provider out of office, Requesting courtesy refill to cover him until next ov 7/22/20.  Please adv [phone]

## 2020-07-02 RX ORDER — ALBUTEROL SULFATE 90 UG/1
AEROSOL, METERED RESPIRATORY (INHALATION)
Qty: 18 G | Refills: 0 | Status: SHIPPED | OUTPATIENT
Start: 2020-07-02 | End: 2020-07-22 | Stop reason: SDUPTHER

## 2020-07-02 NOTE — TELEPHONE ENCOUNTER
Last Visit: 11/18/19 with GONZALO Thomas  Next Appointment: 7/22/20 with GONZALO Thomas  Previous Refill Encounter(s): 6/2/20 #1    Requested Prescriptions     Pending Prescriptions Disp Refills    albuterol (Ventolin HFA) 90 mcg/actuation inhaler [Pharmacy Med Name: VENTOLIN HFA 90 MCG INHALER] 18 g 0     Sig: inhale 1 to 2 puffs by mouth and INTO THE LUNGS every 4 to 6 hours if needed

## 2020-07-22 ENCOUNTER — VIRTUAL VISIT (OUTPATIENT)
Dept: FAMILY MEDICINE CLINIC | Age: 65
End: 2020-07-22

## 2020-07-22 DIAGNOSIS — J44.9 CHRONIC OBSTRUCTIVE PULMONARY DISEASE, UNSPECIFIED COPD TYPE (HCC): ICD-10-CM

## 2020-07-22 DIAGNOSIS — I10 ESSENTIAL HYPERTENSION: ICD-10-CM

## 2020-07-22 DIAGNOSIS — E78.2 MIXED HYPERLIPIDEMIA: ICD-10-CM

## 2020-07-22 DIAGNOSIS — I10 ESSENTIAL HYPERTENSION: Primary | ICD-10-CM

## 2020-07-22 RX ORDER — ALBUTEROL SULFATE 90 UG/1
AEROSOL, METERED RESPIRATORY (INHALATION)
Qty: 18 G | Refills: 1 | Status: SHIPPED | OUTPATIENT
Start: 2020-07-22 | End: 2020-09-04

## 2020-07-22 RX ORDER — AMLODIPINE BESYLATE AND BENAZEPRIL HYDROCHLORIDE 10; 40 MG/1; MG/1
1 CAPSULE ORAL DAILY
Qty: 90 CAP | Refills: 1 | Status: SHIPPED | OUTPATIENT
Start: 2020-07-22 | End: 2021-02-02 | Stop reason: SDUPTHER

## 2020-07-22 RX ORDER — METOPROLOL SUCCINATE 100 MG/1
100 TABLET, EXTENDED RELEASE ORAL DAILY
Qty: 90 TAB | Refills: 1 | Status: SHIPPED | OUTPATIENT
Start: 2020-07-22 | End: 2021-02-02 | Stop reason: SDUPTHER

## 2020-07-22 NOTE — PROGRESS NOTES
Stefany Steel is a 63 y.o. male who was seen by synchronous (real-time) audio-video technology on 7/22/2020 for chronic care    I am working from home. Patient is at his home. Subjective:     Patient saw on TV some doctor recommended not to take a statin during COVID so he stopped it about 8 weeks ago. Prior to Admission medications    Medication Sig Start Date End Date Taking? Authorizing Provider   albuterol (Ventolin HFA) 90 mcg/actuation inhaler inhale 1 to 2 puffs by mouth and INTO THE LUNGS every 4 to 6 hours if needed 7/2/20   MD osmin Brambila [facility]) 15 mg tablet take 1 tablet by mouth once daily 6/26/20   Tracee Thomas NP   metoprolol succinate (TOPROL-XL) 100 mg tablet take 1 tablet by mouth once daily 5/20/20   Tracee Thomas NP   Spiriva Respimat 2.5 mcg/actuation inhaler take 2 puffs once daily 4/27/20   Maribell Parham NP   Anay Amen 640-18 mcg/dose inhaler inhale 1 puff by mouth and INTO THE LUNGS once daily 3/17/20   Tracee Thomas NP   amLODIPine-benazepril (LOTREL) 10-40 mg per capsule take 1 capsule by mouth once daily 2/16/20   Tracee Thomas NP   ketorolac (TORADOL) 10 mg tablet Take 1 Tab by mouth every six (6) hours as needed for Pain. 1/9/20   Marcel Ferreira MD   acetaminophen (TYLENOL) 500 mg tablet Take 1 Tab by mouth every six (6) hours as needed for Pain. 1/9/20   Marcel Ferreira MD   atorvastatin (LIPITOR) 40 mg tablet take 1 tablet by mouth once daily 12/27/19   Tracee Thomas NP   albuterol (PROVENTIL) 5 mg/mL nebulizer solution by Nebulization route once. Provider, Historical     There are no active problems to display for this patient. Current Outpatient Medications   Medication Sig Dispense Refill    amLODIPine-benazepril (LOTREL) 10-40 mg per capsule Take 1 Cap by mouth daily. 90 Cap 1    metoprolol succinate (TOPROL-XL) 100 mg tablet Take 1 Tab by mouth daily.  90 Tab 1    tiotropium bromide (Spiriva Respimat) 2.5 mcg/actuation inhaler Take 2 Puffs by inhalation daily. 1 Inhaler 5    albuterol (Ventolin HFA) 90 mcg/actuation inhaler inhale 1 to 2 puffs by mouth and INTO THE LUNGS every 4 to 6 hours if needed 18 g 1    meloxicam (MOBIC) 15 mg tablet take 1 tablet by mouth once daily 90 Tab 1    Breo Ellipta 200-25 mcg/dose inhaler inhale 1 puff by mouth and INTO THE LUNGS once daily 1 Inhaler 5    ketorolac (TORADOL) 10 mg tablet Take 1 Tab by mouth every six (6) hours as needed for Pain. 24 Tab 0    acetaminophen (TYLENOL) 500 mg tablet Take 1 Tab by mouth every six (6) hours as needed for Pain. 24 Tab 0    atorvastatin (LIPITOR) 40 mg tablet take 1 tablet by mouth once daily 90 Tab 1    albuterol (PROVENTIL) 5 mg/mL nebulizer solution by Nebulization route once. No Known Allergies  Past Medical History:   Diagnosis Date    Arthritis     Chronic obstructive pulmonary disease ([facility] [address]     Hypercholesterolemia     Hypertension      Past Surgical History:   Procedure Laterality Date    HX CHOLECYSTECTOMY      HX GI      HX HERNIA REPAIR      HX HERNIA REPAIR  2020    right inguinal hernia     No family history on file. Social History     Tobacco Use    Smoking status: Heavy Tobacco Smoker     Packs/day: 2.00    Smokeless tobacco: Former User   Substance Use Topics    Alcohol use: Yes     Alcohol/week: 42.0 standard drinks     Types: 42 Cans of beer per week     Comment: 6 cans beer daily       Review of Systems   Constitutional: Negative. Respiratory: Negative. Cardiovascular: Negative. Objective:   No flowsheet data found.      [INSTRUCTIONS:  \"[x]\" Indicates a positive item  \"[]\" Indicates a negative item  -- DELETE ALL ITEMS NOT EXAMINED]    Constitutional: [x] Appears well-developed and well-nourished [x] No apparent distress          Mental status: [x] Alert and awake  [x] Oriented to person/place/time [x] Able to follow commands        Eyes:   EOM    [x]  Normal      Sclera  [x]  Normal Discharge [x]  None visible       HENT: [x] Normocephalic, atraumatic            Pulmonary/Chest: [x] Respiratory effort normal   [x] No visualized signs of difficulty breathing or respiratory distress        Musculoskeletal:           [x] Normal range of motion of neck           Neurological:        [x] No Facial Asymmetry (Cranial nerve 7 motor function) (limited exam due to video visit)          [x] No gaze palsy                 Psychiatric:       [x] Normal Affect        [x] No Hallucinations    Diagnoses and all orders for this visit:    Essential hypertension  -     METABOLIC PANEL, COMPREHENSIVE; Future  -     amLODIPine-benazepril (LOTREL) 10-40 mg per capsule; Take 1 Cap by mouth daily. , Normal, Disp-90 Cap,R-1  -     metoprolol succinate (TOPROL-XL) 100 mg tablet; Take 1 Tab by mouth daily. , Normal, Disp-90 Tab,R-1    Chronic obstructive pulmonary disease, unspecified COPD type (HCC)  -     tiotropium bromide (Spiriva Respimat) 2.5 mcg/actuation inhaler; Take 2 Puffs by inhalation daily. , Normal, Disp-1 Inhaler,R-5  -     albuterol (Ventolin HFA) 90 mcg/actuation inhaler; inhale 1 to 2 puffs by mouth and INTO THE LUNGS every 4 to 6 hours if needed, Normal, Disp-18 g,R-1    Mixed hyperlipidemia  -     LIPID PANEL; Future      PLAN:  We reviewed his medication list.  Patient will come in on 7- for labs  Consider not restarting the statin depending on lab results. I have discussed the diagnosis with the patient and the intended plan as seen in the above orders. The patient has received an after-visit summary and questions were answered concerning future plans. I have discussed medication side effects and warnings with the patient as well. Patient will call for further questions. Follow-up and Dispositions    · Return in about 6 months (around 1/22/2021). We discussed the expected course, resolution and complications of the diagnosis(es) in detail.   Medication risks, benefits, costs, interactions, and alternatives were discussed as indicated. I advised him to contact the office if his condition worsens, changes or fails to improve as anticipated. He expressed understanding with the diagnosis(es) and plan. Nahomy Albrecht, who was evaluated through a patient-initiated, synchronous (real-time) audio-video encounter, and/or his healthcare decision maker, is aware that it is a billable service, with coverage as determined by his insurance carrier. He provided verbal consent to proceed: Yes, and patient identification was verified. It was conducted pursuant to the emergency declaration under the [address] [place] [address], [address] [place] [address] authority and the [employer] and [employer] General Act. A caregiver was present when appropriate. Ability to conduct physical exam was limited. I was at home. The patient was at home.       Swati Gillette, GONZALO

## 2020-07-25 LAB
A-G RATIO,AGRAT: 2.1 RATIO (ref 1.1–2.6)
ALBUMIN SERPL-MCNC: 4.4 G/DL (ref 3.5–5)
ALP SERPL-CCNC: 109 U/L (ref 40–125)
ALT SERPL-CCNC: 15 U/L (ref 5–40)
ANION GAP SERPL CALC-SCNC: 13 MMOL/L (ref 3–15)
AST SERPL W P-5'-P-CCNC: 17 U/L (ref 10–37)
BILIRUB SERPL-MCNC: 0.4 MG/DL (ref 0.2–1.2)
BUN SERPL-MCNC: 9 MG/DL (ref 6–22)
CALCIUM SERPL-MCNC: 9.4 MG/DL (ref 8.4–10.5)
CHLORIDE SERPL-SCNC: 97 MMOL/L (ref 98–110)
CHOLEST SERPL-MCNC: 193 MG/DL (ref 110–200)
CO2 SERPL-SCNC: 27 MMOL/L (ref 20–32)
CREAT SERPL-MCNC: 0.8 MG/DL (ref 0.8–1.6)
GFRAA, 66117: >60
GFRNA, 66118: >60
GLOBULIN,GLOB: 2.1 G/DL (ref 2–4)
GLUCOSE SERPL-MCNC: 99 MG/DL (ref 70–99)
HDLC SERPL-MCNC: 2.6 MG/DL (ref 0–5)
HDLC SERPL-MCNC: 74 MG/DL
LDL/HDL RATIO,LDHD: 1.4
LDLC SERPL CALC-MCNC: 107 MG/DL (ref 50–99)
NON-HDL CHOLESTEROL, 011976: 119 MG/DL
POTASSIUM SERPL-SCNC: 5.5 MMOL/L (ref 3.5–5.5)
PROT SERPL-MCNC: 6.5 G/DL (ref 6.2–8.1)
SODIUM SERPL-SCNC: 137 MMOL/L (ref 133–145)
TRIGL SERPL-MCNC: 62 MG/DL (ref 40–149)
VLDLC SERPL CALC-MCNC: 12 MG/DL (ref 8–30)

## 2020-09-04 DIAGNOSIS — J44.9 CHRONIC OBSTRUCTIVE PULMONARY DISEASE, UNSPECIFIED COPD TYPE (HCC): ICD-10-CM

## 2020-09-04 RX ORDER — ALBUTEROL SULFATE 90 UG/1
AEROSOL, METERED RESPIRATORY (INHALATION)
Qty: 18 G | Refills: 1 | Status: SHIPPED | OUTPATIENT
Start: 2020-09-04 | End: 2020-10-12

## 2020-09-19 DIAGNOSIS — J44.9 CHRONIC OBSTRUCTIVE PULMONARY DISEASE, UNSPECIFIED COPD TYPE (HCC): ICD-10-CM

## 2020-09-19 RX ORDER — FLUTICASONE FUROATE AND VILANTEROL TRIFENATATE 200; 25 UG/1; UG/1
POWDER RESPIRATORY (INHALATION)
Qty: 1 INHALER | Refills: 2 | Status: SHIPPED | OUTPATIENT
Start: 2020-09-19 | End: 2020-12-21

## 2020-10-10 DIAGNOSIS — J44.9 CHRONIC OBSTRUCTIVE PULMONARY DISEASE, UNSPECIFIED COPD TYPE (HCC): ICD-10-CM

## 2020-10-12 RX ORDER — ALBUTEROL SULFATE 90 UG/1
AEROSOL, METERED RESPIRATORY (INHALATION)
Qty: 18 G | Refills: 1 | Status: SHIPPED | OUTPATIENT
Start: 2020-10-12 | End: 2020-11-13

## 2020-11-13 DIAGNOSIS — J44.9 CHRONIC OBSTRUCTIVE PULMONARY DISEASE, UNSPECIFIED COPD TYPE (HCC): ICD-10-CM

## 2020-11-13 RX ORDER — ALBUTEROL SULFATE 90 UG/1
AEROSOL, METERED RESPIRATORY (INHALATION)
Qty: 18 G | Refills: 1 | Status: SHIPPED | OUTPATIENT
Start: 2020-11-13 | End: 2020-12-21

## 2020-12-15 DIAGNOSIS — J44.9 CHRONIC OBSTRUCTIVE PULMONARY DISEASE, UNSPECIFIED COPD TYPE (HCC): ICD-10-CM

## 2020-12-16 DIAGNOSIS — J44.9 CHRONIC OBSTRUCTIVE PULMONARY DISEASE, UNSPECIFIED COPD TYPE (HCC): ICD-10-CM

## 2020-12-21 RX ORDER — FLUTICASONE FUROATE AND VILANTEROL TRIFENATATE 200; 25 UG/1; UG/1
1 POWDER RESPIRATORY (INHALATION) DAILY
Qty: 30 EACH | Refills: 0 | Status: SHIPPED | OUTPATIENT
Start: 2020-12-21 | End: 2021-01-20

## 2020-12-21 RX ORDER — MELOXICAM 15 MG/1
15 TABLET ORAL DAILY
Qty: 30 TAB | Refills: 0 | Status: SHIPPED | OUTPATIENT
Start: 2020-12-21 | End: 2021-02-02 | Stop reason: SDUPTHER

## 2020-12-21 RX ORDER — ALBUTEROL SULFATE 90 UG/1
AEROSOL, METERED RESPIRATORY (INHALATION)
Qty: 18 G | Refills: 0 | Status: SHIPPED | OUTPATIENT
Start: 2020-12-21 | End: 2020-12-23

## 2020-12-21 NOTE — TELEPHONE ENCOUNTER
Last Visit: 7/22/20 with GONZALO Thomas  Next Appointment: Advised to follow-up in 6 months  Previous Refill Encounter(s): 9/19/20 #1 with 2 refills    Requested Prescriptions     Pending Prescriptions Disp Refills    Breo Ellipta 200-25 mcg/dose inhaler [Pharmacy Med Name: Torres Manna 200-25 MCG INH] 1 Inhaler 1     Sig: inhale 1 puff by mouth and INTO THE LUNGS once daily

## 2020-12-23 DIAGNOSIS — J44.9 CHRONIC OBSTRUCTIVE PULMONARY DISEASE, UNSPECIFIED COPD TYPE (HCC): ICD-10-CM

## 2020-12-23 RX ORDER — ALBUTEROL SULFATE 90 UG/1
AEROSOL, METERED RESPIRATORY (INHALATION)
Qty: 18 G | Refills: 0 | Status: SHIPPED | OUTPATIENT
Start: 2020-12-23 | End: 2021-01-15

## 2021-01-27 DIAGNOSIS — J44.9 CHRONIC OBSTRUCTIVE PULMONARY DISEASE, UNSPECIFIED COPD TYPE (HCC): ICD-10-CM

## 2021-01-27 RX ORDER — TIOTROPIUM BROMIDE INHALATION SPRAY 3.12 UG/1
SPRAY, METERED RESPIRATORY (INHALATION)
OUTPATIENT
Start: 2021-01-27

## 2021-02-02 ENCOUNTER — VIRTUAL VISIT (OUTPATIENT)
Dept: FAMILY MEDICINE CLINIC | Age: 66
End: 2021-02-02
Payer: COMMERCIAL

## 2021-02-02 DIAGNOSIS — E78.2 MIXED HYPERLIPIDEMIA: ICD-10-CM

## 2021-02-02 DIAGNOSIS — J44.9 CHRONIC OBSTRUCTIVE PULMONARY DISEASE, UNSPECIFIED COPD TYPE (HCC): ICD-10-CM

## 2021-02-02 DIAGNOSIS — I10 ESSENTIAL HYPERTENSION: ICD-10-CM

## 2021-02-02 PROCEDURE — 99214 OFFICE O/P EST MOD 30 MIN: CPT | Performed by: NURSE PRACTITIONER

## 2021-02-02 RX ORDER — METOPROLOL SUCCINATE 100 MG/1
100 TABLET, EXTENDED RELEASE ORAL DAILY
Qty: 90 TAB | Refills: 1 | Status: SHIPPED | OUTPATIENT
Start: 2021-02-02 | End: 2021-08-31

## 2021-02-02 RX ORDER — AMLODIPINE BESYLATE AND BENAZEPRIL HYDROCHLORIDE 10; 40 MG/1; MG/1
1 CAPSULE ORAL DAILY
Qty: 90 CAP | Refills: 1 | Status: SHIPPED | OUTPATIENT
Start: 2021-02-02 | End: 2021-08-31

## 2021-02-02 RX ORDER — ATORVASTATIN CALCIUM 40 MG/1
TABLET, FILM COATED ORAL
Qty: 90 TAB | Refills: 1 | Status: SHIPPED | OUTPATIENT
Start: 2021-02-02 | End: 2021-10-22

## 2021-02-02 RX ORDER — FLUTICASONE FUROATE, UMECLIDINIUM BROMIDE AND VILANTEROL TRIFENATATE 200; 62.5; 25 UG/1; UG/1; UG/1
1 POWDER RESPIRATORY (INHALATION) DAILY
Qty: 60 EACH | Refills: 3 | Status: SHIPPED | OUTPATIENT
Start: 2021-02-02 | End: 2021-02-09 | Stop reason: SDUPTHER

## 2021-02-02 RX ORDER — MELOXICAM 15 MG/1
15 TABLET ORAL DAILY
Qty: 90 TAB | Refills: 1 | Status: SHIPPED | OUTPATIENT
Start: 2021-02-02 | End: 2022-10-25

## 2021-02-02 RX ORDER — ALBUTEROL SULFATE 90 UG/1
AEROSOL, METERED RESPIRATORY (INHALATION)
Qty: 18 G | Refills: 0 | Status: SHIPPED | OUTPATIENT
Start: 2021-02-02 | End: 2021-02-26

## 2021-02-02 RX ORDER — ALBUTEROL SULFATE 5 MG/ML
2.5 SOLUTION RESPIRATORY (INHALATION)
Qty: 0.5 ML | Refills: 0
Start: 2021-02-02

## 2021-02-02 NOTE — PROGRESS NOTES
Pauline Shah is a 63 y.o. male who was seen by synchronous (real-time) audio-video technology on 2/2/2021 for Medication Refill, Hypertension, COPD, and Cholesterol Problem    Diet and Lifestyle: not attempting to follow a low sodium diet  Home BP Monitoring: is not measured at home    Cardiovascular ROS: taking medications as instructed, no medication side effects noted, no TIA's, no chest pain on exertion, no dyspnea on exertion, no swelling of ankles. New concerns: Need his medications for COPD, he has been having SOB due to being out of his medication. hypertension control uncertain, hyperlipidemia control uncertain. lab results and schedule of future lab studies reviewed with patient  repeat labs ordered prior to next appointment  recommended sodium restriction. Assessment & Plan:   Diagnoses and all orders for this visit:    1. Chronic obstructive pulmonary disease, unspecified COPD type (HCC)  -     albuterol (Ventolin HFA) 90 mcg/actuation inhaler; inhale 1 to 2 puffs by mouth and INTO THE LUNGS every 4 to 6 hours if needed PT NEEDS AN APPOINTMENT PRIOR TO NEXT REFILL. -     tiotropium bromide (Spiriva Respimat) 2.5 mcg/actuation inhaler; Take 2 Puffs by inhalation daily. 2. Essential hypertension  -     amLODIPine-benazepril (LOTREL) 10-40 mg per capsule; Take 1 Cap by mouth daily. -     metoprolol succinate (TOPROL-XL) 100 mg tablet; Take 1 Tab by mouth daily.  -     METABOLIC PANEL, BASIC; Future    3. Mixed hyperlipidemia  -     atorvastatin (LIPITOR) 40 mg tablet; take 1 tablet by mouth once daily  -     LIPID PANEL; Future  -     ALT; Future  -     AST; Future    Other orders  -     albuterol (PROVENTIL) 5 mg/mL nebulizer solution; 0.5 mL by Nebulization route every four (4) hours as needed for Wheezing.  -     fluticasone-umeclidin-vilanter (Trelegy Ellipta) 200-62.5-25 mcg dsdv; Take 1 Puff by inhalation daily. -     meloxicam (MOBIC) 15 mg tablet;  Take 1 Tab by mouth daily.      Follow-up and Dispositions    · Return in about 4 weeks (around 3/2/2021) for Chronic Disease Management, (VV). Routing History          [id number]  Subjective:       Prior to Admission medications    Medication Sig Start Date End Date Taking? Authorizing Provider   albuterol (PROVENTIL) 5 mg/mL nebulizer solution 0.5 mL by Nebulization route every four (4) hours as needed for Wheezing. 2/2/21  Yes Alma Adame NP   albuterol (Ventolin HFA) 90 mcg/actuation inhaler inhale 1 to 2 puffs by mouth and INTO THE LUNGS every 4 to 6 hours if needed PT NEEDS AN APPOINTMENT PRIOR TO NEXT REFILL. 2/2/21  Yes Alma Adame NP   amLODIPine-benazepril (LOTREL) 10-40 mg per capsule Take 1 Cap by mouth daily. 2/2/21  Yes Alma Adame NP   atorvastatin (LIPITOR) 40 mg tablet take 1 tablet by mouth once daily 2/2/21  Yes Alma Adame NP   tiotropium bromide (Spiriva Respimat) 2.5 mcg/actuation inhaler Take 2 Puffs by inhalation daily. 2/2/21  Yes Alma Adame NP   fluticasone-umeclidin-vilanter (Trelegy Ellipta) 810-57.9-51 mcg dsdv Take 1 Puff by inhalation daily. 2/2/21  Yes Alma Adame NP   meloxicam (MOBIC) 15 mg tablet Take 1 Tab by mouth daily. 2/2/21  Yes Alma Adame NP   metoprolol succinate (TOPROL-XL) 100 mg tablet Take 1 Tab by mouth daily. 2/2/21  Yes Alma Adame NP   fluticasone furoate-vilanteroL (Breo Ellipta) 200-25 mcg/dose inhaler Take 1 Puff by inhalation daily. PT NEEDS AN APPOINTMENT PRIOR TO NEXT REFILL. 1/20/21 2/2/21  Alma Adame NP   albuterol (Ventolin HFA) 90 mcg/actuation inhaler inhale 1 to 2 puffs by mouth and INTO THE LUNGS every 4 to 6 hours if needed PT NEEDS AN APPOINTMENT PRIOR TO NEXT REFILL. 1/15/21 2/2/21  Alma Adame NP   meloxicam (MOBIC) 15 mg tablet Take 1 Tab by mouth daily. PT NEEDS AN APPOINTMENT PRIOR TO NEXT REFILL.  12/21/20 2/2/21  Alma Adame NP   amLODIPine-benazepril (LOTREL) 10-40 mg per capsule Take 1 Cap by mouth daily. 7/22/20 2/2/21  Valentin Lagunas NP   metoprolol succinate (TOPROL-XL) 100 mg tablet Take 1 Tab by mouth daily. 7/22/20 2/2/21  Michelle Thomas NP   tiotropium bromide (Spiriva Respimat) 2.5 mcg/actuation inhaler Take 2 Puffs by inhalation daily. 7/22/20 2/2/21  Michelle Thomas NP   acetaminophen (TYLENOL) 500 mg tablet Take 1 Tab by mouth every six (6) hours as needed for Pain. 1/9/20   María Mcgee MD   ketorolac (TORADOL) 10 mg tablet Take 1 Tab by mouth every six (6) hours as needed for Pain. 1/9/20 2/2/21  María Mcgee MD   atorvastatin (LIPITOR) 40 mg tablet take 1 tablet by mouth once daily 12/27/19 2/2/21  Michelle Thomas NP   albuterol (PROVENTIL) 5 mg/mL nebulizer solution by Nebulization route once. 2/2/21  Provider, Historical     Patient Active Problem List   Diagnosis Code    Hypertension I10    Hypercholesterolemia E78.00    Chronic obstructive pulmonary disease (HCC) J44.9    Arthritis M19.90     Current Outpatient Medications   Medication Sig Dispense Refill    albuterol (PROVENTIL) 5 mg/mL nebulizer solution 0.5 mL by Nebulization route every four (4) hours as needed for Wheezing. 0.5 mL 0    albuterol (Ventolin HFA) 90 mcg/actuation inhaler inhale 1 to 2 puffs by mouth and INTO THE LUNGS every 4 to 6 hours if needed PT NEEDS AN APPOINTMENT PRIOR TO NEXT REFILL. 18 g 0    amLODIPine-benazepril (LOTREL) 10-40 mg per capsule Take 1 Cap by mouth daily. 90 Cap 1    atorvastatin (LIPITOR) 40 mg tablet take 1 tablet by mouth once daily 90 Tab 1    tiotropium bromide (Spiriva Respimat) 2.5 mcg/actuation inhaler Take 2 Puffs by inhalation daily. 1 Inhaler 5    fluticasone-umeclidin-vilanter (Trelegy Ellipta) 200-62.5-25 mcg dsdv Take 1 Puff by inhalation daily. 60 Each 3    meloxicam (MOBIC) 15 mg tablet Take 1 Tab by mouth daily. 90 Tab 1    metoprolol succinate (TOPROL-XL) 100 mg tablet Take 1 Tab by mouth daily.  90 Tab 1    acetaminophen (TYLENOL) 500 mg tablet Take 1 Tab by mouth every six (6) hours as needed for Pain. 24 Tab 0     No Known Allergies    ROS    Objective:   No flowsheet data found. General: alert, cooperative, no distress   Mental  status: normal mood, behavior, speech, dress, motor activity, and thought processes, able to follow commands   HENT: NCAT   Neck: no visualized mass   Resp: no respiratory distress   Neuro: no gross deficits   Skin: no discoloration or lesions of concern on visible areas   Psychiatric: normal affect, consistent with stated mood, no evidence of hallucinations     Additional exam findings: N/A      We discussed the expected course, resolution and complications of the diagnosis(es) in detail. Medication risks, benefits, costs, interactions, and alternatives were discussed as indicated. I advised him to contact the office if his condition worsens, changes or fails to improve as anticipated. He expressed understanding with the diagnosis(es) and plan. Julián Ireland, who was evaluated through a patient-initiated, synchronous (real-time) audio-video encounter, and/or his healthcare decision maker, is aware that it is a billable service, with coverage as determined by his insurance carrier. He provided verbal consent to proceed: Yes, and patient identification was verified. It was conducted pursuant to the emergency declaration under the [facility], [address] [place] [address] authority and the [employer] and [employer] General Act. A caregiver was present when appropriate. Ability to conduct physical exam was limited. I was in the office. The patient was at home.       Domo Cheng NP

## 2021-02-08 NOTE — TELEPHONE ENCOUNTER
Called and spoke with patient about locating this med elsewhere. He said he has already advised the pharmacy that he does not want to change the med and for them to call around to locate it. No further action needed regarding this. Patient said he needs to come in for his labs before his March appointment. He said he's been trying to contact the office but can never get through. Can someone please assist with this?

## 2021-02-09 RX ORDER — FLUTICASONE FUROATE, UMECLIDINIUM BROMIDE AND VILANTEROL TRIFENATATE 200; 62.5; 25 UG/1; UG/1; UG/1
1 POWDER RESPIRATORY (INHALATION) DAILY
Qty: 60 EACH | Refills: 3 | Status: SHIPPED | OUTPATIENT
Start: 2021-02-09 | End: 2021-07-09

## 2021-02-09 NOTE — TELEPHONE ENCOUNTER
[facility] called again stating they checked every [facility] within a 50 mile radius and were unable to locate this med. I contact some pharmacies and was able to locate it at [facility] on [facility]. Patient was contacted and advised of this information. Med has been pended to [facility]. Please route back to me once completed so I may inform patient the Rx was sent. Thanks    Requested Prescriptions     Pending Prescriptions Disp Refills   • fluticasone-umeclidin-vilanter (Trelegy Ellipta) 200-62.5-25 mcg dsdv 60 Each 3     Sig: Take 1 Puff by inhalation daily.

## 2021-02-22 ENCOUNTER — APPOINTMENT (OUTPATIENT)
Dept: FAMILY MEDICINE CLINIC | Age: 66
End: 2021-02-22

## 2021-02-22 DIAGNOSIS — E78.2 MIXED HYPERLIPIDEMIA: ICD-10-CM

## 2021-02-22 DIAGNOSIS — I10 ESSENTIAL HYPERTENSION: ICD-10-CM

## 2021-02-23 LAB
ALT SERPL-CCNC: 16 U/L (ref 5–40)
ANION GAP SERPL CALC-SCNC: 9.3 MMOL/L (ref 3–15)
AST SERPL W P-5'-P-CCNC: 19 U/L (ref 10–37)
BUN SERPL-MCNC: 8 MG/DL (ref 6–22)
CALCIUM SERPL-MCNC: 9.5 MG/DL (ref 8.4–10.5)
CHLORIDE SERPL-SCNC: 93 MMOL/L (ref 98–110)
CHOLEST SERPL-MCNC: 229 MG/DL (ref 110–200)
CO2 SERPL-SCNC: 29 MMOL/L (ref 20–32)
CREAT SERPL-MCNC: 0.7 MG/DL (ref 0.8–1.6)
GFRAA, 66117: >60
GFRNA, 66118: >60
GLUCOSE SERPL-MCNC: 94 MG/DL (ref 70–99)
HDLC SERPL-MCNC: 3.1 MG/DL (ref 0–5)
HDLC SERPL-MCNC: 75 MG/DL
LDL/HDL RATIO,LDHD: 1.8
LDLC SERPL CALC-MCNC: 137 MG/DL (ref 50–99)
NON-HDL CHOLESTEROL, 011976: 154 MG/DL
POTASSIUM SERPL-SCNC: 5.7 MMOL/L (ref 3.5–5.5)
SODIUM SERPL-SCNC: 131 MMOL/L (ref 133–145)
TRIGL SERPL-MCNC: 86 MG/DL (ref 40–149)
VLDLC SERPL CALC-MCNC: 17 MG/DL (ref 8–30)

## 2021-02-24 DIAGNOSIS — J44.9 CHRONIC OBSTRUCTIVE PULMONARY DISEASE, UNSPECIFIED COPD TYPE (HCC): ICD-10-CM

## 2021-02-26 RX ORDER — ALBUTEROL SULFATE 90 UG/1
AEROSOL, METERED RESPIRATORY (INHALATION)
Qty: 18 G | Refills: 0 | Status: SHIPPED | OUTPATIENT
Start: 2021-02-26 | End: 2021-03-05 | Stop reason: SDUPTHER

## 2021-03-05 ENCOUNTER — VIRTUAL VISIT (OUTPATIENT)
Dept: FAMILY MEDICINE CLINIC | Age: 66
End: 2021-03-05
Payer: COMMERCIAL

## 2021-03-05 DIAGNOSIS — J44.9 CHRONIC OBSTRUCTIVE PULMONARY DISEASE, UNSPECIFIED COPD TYPE (HCC): ICD-10-CM

## 2021-03-05 PROCEDURE — 99214 OFFICE O/P EST MOD 30 MIN: CPT | Performed by: NURSE PRACTITIONER

## 2021-03-05 RX ORDER — ALBUTEROL SULFATE 90 UG/1
AEROSOL, METERED RESPIRATORY (INHALATION)
Qty: 18 G | Refills: 5 | Status: SHIPPED | OUTPATIENT
Start: 2021-03-05 | End: 2021-07-02

## 2021-03-05 NOTE — PROGRESS NOTES
Matthieu Krueger is a 61 y.o. male who was seen by synchronous (real-time) audio-video technology on 3/5/2021 for COPD, Hypertension, and Cholesterol Problem    Hypertension/Cholesterol Review:    Diet and Lifestyle: generally follows a low sodium diet  Home BP Monitorin/67 at the [facility] 2 weeks ago    Cardiovascular ROS: taking medications as instructed, no medication side effects noted, no TIA's, no chest pain on exertion, no dyspnea on exertion, no swelling of ankles. Pt had stopped due to him hearing that he should not take statins during COVID. He has since restarted it. Drea Manzanares Crane Antihyperlipidemia Meds             atorvastatin (LIPITOR) 40 mg tablet (Taking) take 1 tablet by mouth once daily           COPD Review:    The patient is being seen for follow up of COPD. r Oxygen: He currently is not on home oxygen therapy. Symptoms: cough: severity: mild, intolerable: sputum : none  symptoms worse with exertion. .   Patient uses 1 pillows at night. Patient admits to smoke cigarettes. Pt states he smokes 2 packs per day. When asked about quitting he says he will think about it. Pt was given education on the effects of smoking and COPD, and cardiovascular disease. hypertension stable, hyperlipidemia poorly controlled. current treatment plan is effective, no change in therapy  orders and follow up as documented in patient record. Assessment & Plan:   Diagnoses and all orders for this visit:    1. Chronic obstructive pulmonary disease, unspecified COPD type (HCC)  -     albuterol (Ventolin HFA) 90 mcg/actuation inhaler; inhale 1 to 2 puffs by mouth and INTO THE LUNGS every 4 to 6 hours if needed . Follow-up and Dispositions    · Return in about 6 months (around 2021) for Hypertension, High Cholesterol, COPD, (In Office). Routing History      712  Subjective:       Prior to Admission medications    Medication Sig Start Date End Date Taking?  Authorizing Provider   albuterol (Ventolin HFA) 90 mcg/actuation inhaler inhale 1 to 2 puffs by mouth and INTO THE LUNGS every 4 to 6 hours if needed . 3/5/21  Yes Maya Cobian NP   fluticasone-umeclidin-vilanter (Trelegy Ellipta) 954-20.8-36 mcg dsdv Take 1 Puff by inhalation daily. 2/9/21  Yes Maya Cobian NP   albuterol (PROVENTIL) 5 mg/mL nebulizer solution 0.5 mL by Nebulization route every four (4) hours as needed for Wheezing. 2/2/21  Yes Maya Cobian NP   amLODIPine-benazepril (LOTREL) 10-40 mg per capsule Take 1 Cap by mouth daily. 2/2/21  Yes Maya Cobian NP   atorvastatin (LIPITOR) 40 mg tablet take 1 tablet by mouth once daily 2/2/21  Yes Maya Cobian NP   tiotropium bromide (Spiriva Respimat) 2.5 mcg/actuation inhaler Take 2 Puffs by inhalation daily. 2/2/21  Yes Maya Cobian NP   meloxicam (MOBIC) 15 mg tablet Take 1 Tab by mouth daily. 2/2/21  Yes Maya Cobian NP   metoprolol succinate (TOPROL-XL) 100 mg tablet Take 1 Tab by mouth daily. 2/2/21  Yes Maya Cobian NP   albuterol (Ventolin HFA) 90 mcg/actuation inhaler inhale 1 to 2 puffs by mouth and INTO THE LUNGS every 4 to 6 hours if needed . NEED APPT PRIOR TO NEXT REFILL 2/26/21 3/5/21  Maya Cobian NP   acetaminophen (TYLENOL) 500 mg tablet Take 1 Tab by mouth every six (6) hours as needed for Pain. 1/9/20 3/5/21  Romeo Salguero MD     Patient Active Problem List   Diagnosis Code    Hypertension I10    Hypercholesterolemia E78.00    Chronic obstructive pulmonary disease (HCC) J44.9    Arthritis M19.90     Current Outpatient Medications   Medication Sig Dispense Refill    albuterol (Ventolin HFA) 90 mcg/actuation inhaler inhale 1 to 2 puffs by mouth and INTO THE LUNGS every 4 to 6 hours if needed . 18 g 5    fluticasone-umeclidin-vilanter (Trelegy Ellipta) 200-62.5-25 mcg dsdv Take 1 Puff by inhalation daily.  60 Each 3    albuterol (PROVENTIL) 5 mg/mL nebulizer solution 0.5 mL by Nebulization route every four (4) hours as needed for Wheezing. 0.5 mL 0    amLODIPine-benazepril (LOTREL) 10-40 mg per capsule Take 1 Cap by mouth daily. 90 Cap 1    atorvastatin (LIPITOR) 40 mg tablet take 1 tablet by mouth once daily 90 Tab 1    tiotropium bromide (Spiriva Respimat) 2.5 mcg/actuation inhaler Take 2 Puffs by inhalation daily. 1 Inhaler 5    meloxicam (MOBIC) 15 mg tablet Take 1 Tab by mouth daily. 90 Tab 1    metoprolol succinate (TOPROL-XL) 100 mg tablet Take 1 Tab by mouth daily. 90 Tab 1     No Known Allergies    ROS    Objective:     Patient-Reported Vitals 3/5/2021   Patient-Reported Weight 157 lbs      General: alert, cooperative, no distress   Mental  status: normal mood, behavior, speech, dress, motor activity, and thought processes, able to follow commands   HENT: NCAT   Neck: no visualized mass   Resp: no respiratory distress   Neuro: no gross deficits   Skin: no discoloration or lesions of concern on visible areas   Psychiatric: normal affect, consistent with stated mood, no evidence of hallucinations     Additional exam findings: N/A      We discussed the expected course, resolution and complications of the diagnosis(es) in detail. Medication risks, benefits, costs, interactions, and alternatives were discussed as indicated. I advised him to contact the office if his condition worsens, changes or fails to improve as anticipated. He expressed understanding with the diagnosis(es) and plan. Frankie Garcia, who was evaluated through a patient-initiated, synchronous (real-time) audio-video encounter, and/or his healthcare decision maker, is aware that it is a billable service, with coverage as determined by his insurance carrier. He provided verbal consent to proceed: Yes, and patient identification was verified. It was conducted pursuant to the emergency declaration under the [facility], [address] [place] [address] authority and the [employer] General Act.  A caregiver was present when appropriate. Ability to conduct physical exam was limited. I was in the office. The patient was at home.       Donna Lyles, GONZALO

## 2021-07-02 DIAGNOSIS — J44.9 CHRONIC OBSTRUCTIVE PULMONARY DISEASE, UNSPECIFIED COPD TYPE (HCC): ICD-10-CM

## 2021-07-02 RX ORDER — ALBUTEROL SULFATE 90 UG/1
AEROSOL, METERED RESPIRATORY (INHALATION)
Qty: 18 G | Refills: 5 | Status: SHIPPED | OUTPATIENT
Start: 2021-07-02 | End: 2021-10-01

## 2021-07-09 RX ORDER — FLUTICASONE FUROATE, UMECLIDINIUM BROMIDE AND VILANTEROL TRIFENATATE 200; 62.5; 25 UG/1; UG/1; UG/1
POWDER RESPIRATORY (INHALATION)
Qty: 60 BLISTER | Refills: 4 | Status: SHIPPED | OUTPATIENT
Start: 2021-07-09 | End: 2021-11-26

## 2021-08-31 DIAGNOSIS — I10 ESSENTIAL HYPERTENSION: ICD-10-CM

## 2021-08-31 RX ORDER — AMLODIPINE BESYLATE AND BENAZEPRIL HYDROCHLORIDE 10; 40 MG/1; MG/1
CAPSULE ORAL
Qty: 90 CAPSULE | Refills: 1 | Status: SHIPPED | OUTPATIENT
Start: 2021-08-31 | End: 2022-03-10

## 2021-08-31 RX ORDER — METOPROLOL SUCCINATE 100 MG/1
TABLET, EXTENDED RELEASE ORAL
Qty: 90 TABLET | Refills: 1 | Status: SHIPPED | OUTPATIENT
Start: 2021-08-31 | End: 2022-10-25

## 2021-10-01 DIAGNOSIS — J44.9 CHRONIC OBSTRUCTIVE PULMONARY DISEASE, UNSPECIFIED COPD TYPE (HCC): ICD-10-CM

## 2021-10-01 RX ORDER — ALBUTEROL SULFATE 90 UG/1
AEROSOL, METERED RESPIRATORY (INHALATION)
Qty: 18 G | Refills: 5 | Status: SHIPPED | OUTPATIENT
Start: 2021-10-01

## 2022-10-25 PROBLEM — J44.1 COPD EXACERBATION (HCC): Status: ACTIVE | Noted: 2022-10-25

## (undated) DEVICE — SOL IRR NACL 0.9% 500ML POUR --

## (undated) DEVICE — LIGHT HANDLE: Brand: DEVON

## (undated) DEVICE — BLADE ASSEMB CLP HAIR FINE --

## (undated) DEVICE — KENDALL SCD EXPRESS SLEEVES, KNEE LENGTH, MEDIUM: Brand: KENDALL SCD

## (undated) DEVICE — REM POLYHESIVE ADULT PATIENT RETURN ELECTRODE: Brand: VALLEYLAB

## (undated) DEVICE — SUTURE VCRL SZ 2-0 L27IN ABSRB UD L26MM SH 1/2 CIR J417H

## (undated) DEVICE — STERILE POLYISOPRENE POWDER-FREE SURGICAL GLOVES: Brand: PROTEXIS

## (undated) DEVICE — ARM DRAPE

## (undated) DEVICE — DERMABOND SKIN ADH 0.7ML -- DERMABOND ADVANCED 12/BX

## (undated) DEVICE — Device

## (undated) DEVICE — SUTURE VLOC 90 2/0 VL 6 GS-22 VLOCM2105

## (undated) DEVICE — DRAPE,UTILITY,TAPE,15X26,STERILE: Brand: MEDLINE

## (undated) DEVICE — SEAL UNIV 5-8MM DISP BX/10 -- DA VINCI XI - SNGL USE

## (undated) DEVICE — COLUMN DRAPE

## (undated) DEVICE — INTENDED FOR TISSUE SEPARATION, AND OTHER PROCEDURES THAT REQUIRE A SHARP SURGICAL BLADE TO PUNCTURE OR CUT.: Brand: BARD-PARKER ® CARBON RIB-BACK BLADES

## (undated) DEVICE — BLADELESS OBTURATOR: Brand: WECK VISTA

## (undated) DEVICE — NDL PRT INJ NSAF BLNT 18GX1.5 --

## (undated) DEVICE — ANTI-FOG SOLUTION WITH FOAM PAD: Brand: DEVON

## (undated) DEVICE — SUTURE MCRYL SZ 4-0 L18IN ABSRB UD L19MM PS-2 3/8 CIR PRIM Y496G

## (undated) DEVICE — PREP SKN CHLRAPRP 26ML TNT -- CONVERT TO ITEM 373320